# Patient Record
Sex: MALE | Race: BLACK OR AFRICAN AMERICAN | NOT HISPANIC OR LATINO | URBAN - METROPOLITAN AREA
[De-identification: names, ages, dates, MRNs, and addresses within clinical notes are randomized per-mention and may not be internally consistent; named-entity substitution may affect disease eponyms.]

---

## 2018-05-11 ENCOUNTER — NEW REFERRAL (OUTPATIENT)
Dept: URBAN - METROPOLITAN AREA CLINIC 96 | Facility: CLINIC | Age: 63
End: 2018-05-11

## 2018-05-11 DIAGNOSIS — S05.12XA: ICD-10-CM

## 2018-05-11 DIAGNOSIS — H33.012: ICD-10-CM

## 2018-05-11 DIAGNOSIS — E11.9: ICD-10-CM

## 2018-05-11 PROCEDURE — 2022F DILAT RTA XM EVC RTNOPTHY: CPT

## 2018-05-11 PROCEDURE — 92225 OPHTHALMOSCOPY (INITIAL): CPT

## 2018-05-11 PROCEDURE — 92134 CPTRZ OPH DX IMG PST SGM RTA: CPT

## 2018-05-11 PROCEDURE — 99204 OFFICE O/P NEW MOD 45 MIN: CPT

## 2018-05-11 PROCEDURE — 4040F PNEUMOC VAC/ADMIN/RCVD: CPT | Mod: 8P

## 2018-05-11 PROCEDURE — G8427 DOCREV CUR MEDS BY ELIG CLIN: HCPCS

## 2018-05-11 ASSESSMENT — VISUAL ACUITY
OD_PH: 20/25-3
OD_CC: 20/50-3
OS_CC: 20/50-1

## 2018-05-11 ASSESSMENT — TONOMETRY
OD_IOP_MMHG: 14
OS_IOP_MMHG: 8

## 2018-05-16 ENCOUNTER — SURGERY/PROCEDURE (OUTPATIENT)
Dept: URBAN - METROPOLITAN AREA MEDICAL CENTER 2 | Facility: MEDICAL CENTER | Age: 63
End: 2018-05-16

## 2018-05-16 DIAGNOSIS — H33.012: ICD-10-CM

## 2018-05-16 PROCEDURE — 67107 REPAIR DETACHED RETINA: CPT

## 2018-05-16 PROCEDURE — 65815 DRAINAGE OF EYE: CPT

## 2018-05-16 PROCEDURE — 67025 REPLACE EYE FLUID: CPT

## 2018-05-18 ENCOUNTER — POST-OP CHECK (OUTPATIENT)
Dept: URBAN - METROPOLITAN AREA CLINIC 109 | Facility: CLINIC | Age: 63
End: 2018-05-18

## 2018-05-18 DIAGNOSIS — H33.012: ICD-10-CM

## 2018-05-18 DIAGNOSIS — E11.9: ICD-10-CM

## 2018-05-18 DIAGNOSIS — H25.813: ICD-10-CM

## 2018-05-18 PROCEDURE — 92226 OPHTHALMOSCOPY (SUB): CPT

## 2018-05-18 PROCEDURE — 99024 POSTOP FOLLOW-UP VISIT: CPT

## 2018-05-18 ASSESSMENT — TONOMETRY
OS_IOP_MMHG: 18
OD_IOP_MMHG: 12

## 2018-05-18 ASSESSMENT — VISUAL ACUITY
OS_SC: CF 5FT
OD_SC: 20/40
OS_PH: 20/60-3
OD_PH: 20/20-3

## 2018-05-25 ENCOUNTER — POST-OP CHECK (OUTPATIENT)
Dept: URBAN - METROPOLITAN AREA CLINIC 109 | Facility: CLINIC | Age: 63
End: 2018-05-25

## 2018-05-25 DIAGNOSIS — H25.813: ICD-10-CM

## 2018-05-25 DIAGNOSIS — H33.012: ICD-10-CM

## 2018-05-25 DIAGNOSIS — E11.9: ICD-10-CM

## 2018-05-25 PROCEDURE — 92134 CPTRZ OPH DX IMG PST SGM RTA: CPT

## 2018-05-25 PROCEDURE — 92226 OPHTHALMOSCOPY (SUB): CPT

## 2018-05-25 PROCEDURE — 99024 POSTOP FOLLOW-UP VISIT: CPT

## 2018-05-25 ASSESSMENT — TONOMETRY
OD_IOP_MMHG: 16
OD_IOP_MMHG: 21
OS_IOP_MMHG: 18

## 2018-05-25 ASSESSMENT — VISUAL ACUITY
OD_PH: 20/20
OS_SC: 20/80-1
OD_SC: 20/25

## 2018-06-08 ENCOUNTER — POST-OP CHECK (OUTPATIENT)
Dept: URBAN - METROPOLITAN AREA CLINIC 109 | Facility: CLINIC | Age: 63
End: 2018-06-08

## 2018-06-08 DIAGNOSIS — E11.9: ICD-10-CM

## 2018-06-08 DIAGNOSIS — H33.012: ICD-10-CM

## 2018-06-08 PROCEDURE — 92134 CPTRZ OPH DX IMG PST SGM RTA: CPT

## 2018-06-08 PROCEDURE — 99024 POSTOP FOLLOW-UP VISIT: CPT

## 2018-06-08 PROCEDURE — 92226 OPHTHALMOSCOPY (SUB): CPT

## 2018-06-08 ASSESSMENT — VISUAL ACUITY
OD_CC: 20/40
OS_PH: 20/60
OS_CC: 20/160
OD_PH: 20/25

## 2018-06-08 ASSESSMENT — TONOMETRY
OS_IOP_MMHG: 21
OD_IOP_MMHG: 21
OS_IOP_MMHG: 18
OD_IOP_MMHG: 22

## 2018-12-21 ENCOUNTER — HOSPITAL ENCOUNTER (INPATIENT)
Dept: HOSPITAL 31 - C.ER | Age: 63
LOS: 7 days | Discharge: LEFT BEFORE DISCHARGE | DRG: 638 | End: 2018-12-28
Attending: INTERNAL MEDICINE | Admitting: INTERNAL MEDICINE
Payer: MEDICARE

## 2018-12-21 DIAGNOSIS — I42.9: ICD-10-CM

## 2018-12-21 DIAGNOSIS — E78.5: ICD-10-CM

## 2018-12-21 DIAGNOSIS — K52.9: ICD-10-CM

## 2018-12-21 DIAGNOSIS — F13.10: ICD-10-CM

## 2018-12-21 DIAGNOSIS — R00.1: ICD-10-CM

## 2018-12-21 DIAGNOSIS — Z91.19: ICD-10-CM

## 2018-12-21 DIAGNOSIS — F11.23: ICD-10-CM

## 2018-12-21 DIAGNOSIS — F17.210: ICD-10-CM

## 2018-12-21 DIAGNOSIS — Z91.14: ICD-10-CM

## 2018-12-21 DIAGNOSIS — I10: ICD-10-CM

## 2018-12-21 DIAGNOSIS — F41.9: ICD-10-CM

## 2018-12-21 DIAGNOSIS — E11.622: Primary | ICD-10-CM

## 2018-12-21 DIAGNOSIS — M17.11: ICD-10-CM

## 2018-12-21 DIAGNOSIS — L97.919: ICD-10-CM

## 2018-12-21 LAB
ALBUMIN SERPL-MCNC: 4.6 G/DL (ref 3.5–5)
ALBUMIN/GLOB SERPL: 1.2 {RATIO} (ref 1–2.1)
ALT SERPL-CCNC: 10 U/L (ref 21–72)
AST SERPL-CCNC: 20 U/L (ref 17–59)
BACTERIA #/AREA URNS HPF: (no result) /[HPF]
BASOPHILS # BLD AUTO: 0.1 K/UL (ref 0–0.2)
BASOPHILS NFR BLD: 0.8 % (ref 0–2)
BILIRUB UR-MCNC: (no result) MG/DL
BUN SERPL-MCNC: 12 MG/DL (ref 9–20)
CALCIUM SERPL-MCNC: 9.6 MG/DL (ref 8.6–10.4)
CK MB SERPL-MCNC: 1.3 NG/ML (ref 0–3.38)
EOSINOPHIL # BLD AUTO: 0 K/UL (ref 0–0.7)
EOSINOPHIL NFR BLD: 0 % (ref 0–4)
ERYTHROCYTE [DISTWIDTH] IN BLOOD BY AUTOMATED COUNT: 15.9 % (ref 11.5–14.5)
GFR NON-AFRICAN AMERICAN: > 60
GLUCOSE UR STRIP-MCNC: NORMAL MG/DL
HGB BLD-MCNC: 13.1 G/DL (ref 12–18)
LEUKOCYTE ESTERASE UR-ACNC: (no result) LEU/UL
LIPASE: 65 U/L (ref 23–300)
LYMPHOCYTES # BLD AUTO: 0.9 K/UL (ref 1–4.3)
LYMPHOCYTES NFR BLD AUTO: 13.9 % (ref 20–40)
MCH RBC QN AUTO: 30.5 PG (ref 27–31)
MCHC RBC AUTO-ENTMCNC: 34.5 G/DL (ref 33–37)
MCV RBC AUTO: 88.5 FL (ref 80–94)
MONOCYTES # BLD: 0.2 K/UL (ref 0–0.8)
MONOCYTES NFR BLD: 3.3 % (ref 0–10)
NEUTROPHILS # BLD: 5.2 K/UL (ref 1.8–7)
NEUTROPHILS NFR BLD AUTO: 82 % (ref 50–75)
NRBC BLD AUTO-RTO: 0 % (ref 0–2)
PH UR STRIP: 5 [PH] (ref 5–8)
PLATELET # BLD: 311 K/UL (ref 130–400)
PMV BLD AUTO: 8.4 FL (ref 7.2–11.7)
PROT UR STRIP-MCNC: (no result) MG/DL
RBC # BLD AUTO: 4.31 MIL/UL (ref 4.4–5.9)
RBC # UR STRIP: NEGATIVE /UL
SP GR UR STRIP: 1.03 (ref 1–1.03)
SQUAMOUS EPITHIAL: 3 /HPF (ref 0–5)
UROBILINOGEN UR-MCNC: 2 MG/DL (ref 0.2–1)
WBC # BLD AUTO: 6.3 K/UL (ref 4.8–10.8)

## 2018-12-21 NOTE — C.PDOC
History Of Present Illness





63 years old male BIBA from pain management center [facility] the [address] for 

abdominal pain associated with nausea, vomiting, diarrhea, and shortness of 

breath that began this morning. Patient reports he has an appt with Dr. KATJA Hyman 

to get his oxycontin, however he began to feel ill prior to the appt.  He states

last time he took his pain medication was 2 days ago.  He also admits to non-

compliance with his diabetes medications x 2 days.  Patient admits to prior 

episodes of opiate withdrawal, states this feels similar.  Patient denies chest 

pain, cough, fever, palpitations, headache, dizziness.


Time Seen by Provider: 12/21/18 12:09


Chief Complaint (Nursing): Abdominal Pain


History Per: Patient


History/Exam Limitations: no limitations


Onset/Duration Of Symptoms: Hrs


Current Symptoms Are (Timing): Still Present


Severity: Moderate


Location Of Pain/Discomfort: Other (diffuse abd pain)


Radiation Of Pain To:: None


Associated Symptoms: Nausea, Vomiting, Diarrhea


Exacerbating Factors: None


Alleviating Factors: None


Last Bowel Movement: Today


Recent travel outside of the [place]: No





Past Medical History


Reviewed: Historical Data, Nursing Documentation, Vital Signs


Vital Signs: 





                                Last Vital Signs











Temp  97.4 F L  12/21/18 12:13


 


Pulse  57 L  12/21/18 13:21


 


Resp  18   12/21/18 13:21


 


BP  121/64   12/21/18 13:21


 


Pulse Ox  97   12/21/18 13:21














- Medical History


PMH: Arthritis, Fractures (Right leg), HTN


Family History: States: No Known Family Hx





- Social History


Hx Alcohol Use: Yes


Hx Substance Use: Yes





- Immunization History


Hx Tetanus Toxoid Vaccination: No


Hx Influenza Vaccination: No


Hx Pneumococcal Vaccination: No





Review Of Systems


Constitutional: Negative for: Fever, Chills


Cardiovascular: Negative for: Chest Pain, Palpitations


Respiratory: Positive for: Shortness of Breath.  Negative for: Cough


Gastrointestinal: Positive for: Nausea, Vomiting, Abdominal Pain, Diarrhea


Skin: Negative for: Rash


Neurological: Negative for: Weakness, Numbness





Physical Exam





- Physical Exam


Appears: Non-toxic, Other (Uncomfortable appearing)


Skin: Normal Color, Warm, Dry, No Rash


Eye(s): bilateral: Normal Inspection


Oral Mucosa: Moist


Neck: Supple


Cardiovascular: Rhythm Regular (Bradycardic ), No Murmur


Respiratory: Normal Breath Sounds, No Rales, No Rhonchi, No Wheezing


Gastrointestinal/Abdominal: Bowel Sounds (Active ), Soft, Tenderness (diffuse 

abdominal TTP, (-) McBurney's, (-) Santana's), No Guarding, No Rebound


Extremity: Normal ROM, Other (right lower leg - chronic skin changes, approx 5-

6cm wound in vertical orientation with mild discharge)


Extremity: Bilateral: Atraumatic, Normal Color And Temperature, Normal ROM


Pulses: Left Dorsalis Pedis: Normal, Right Dorsalis Pedis: Normal


Neurological/Psych: Oriented x3


Gait: Steady





ED Course And Treatment





- Laboratory Results


Result Diagrams: 


                                 12/23/18 08:14





                                 12/26/18 06:42


ECG: Interpreted By Me, Viewed By Me (sinus bradycardia 41 bpm, normal axis, no 

acute ST/T wave changes)


ECG Interpretation: Abnormal


O2 Sat by Pulse Oximetry: 97 (RA)


Pulse Ox Interpretation: Normal





- CT Scan/US


  ** CT ABD/PELVIS


Other Rad Studies (CT/US): Read By Radiologist, Radiology Report Reviewed


CT/US Interpretation: Accession No. : [id number].  Patient Name / ID : 

AMY MAXWELL  / [id number].  Exam Date : 12/21/2018 14:55:59 ( Approved ).  

Study Comment :  Sex / Age : M  / 63Y.  Creator : Amy Pfeiffer.  Dictator : 

Inés Sandoval MD.   :  Approver : Inés Sandoval MD.  

Approver2 :  Report Date : 12/21/2018 15:08:09.  My Comment :  

******************************************************************

*****************.  PROCEDURE:  CT Abdomen and Pelvis without Oral or IV 

contrast.  HISTORY:  ABD PAIN, NAUSEA/VOMITING.  COMPARISON:  None available.  

TECHNIQUE:  Contiguous axial images of the abdomen and pelvis. No oral or IV 

contrast administered. Coronal and Sagittal reformats generated and reviewed.  

Radiation dose:  Total exam DLP = 670.28 mGy-cm.  This CT exam was performed 

using one or more of the following dose reduction techniques: Automated exposure

control, adjustment of the mA and/or kV according to patient size, and/or use of

iterative reconstruction technique.  FINDINGS:  There is limited evaluation of 

the solid organs without the administration of IV contrast.  LOWER THORAX:  No 

visible consolidation, pleural effusion, or pneumothorax.  Mild cardiomegaly.  

LIVER:  Unremarkable unenhanced appearance.  GALLBLADDER AND BILE DUCTS:  

Unremarkable unenhanced appearance.  PANCREAS:  Unremarkable unenhanced 

appearance.  SPLEEN:  Unremarkable unenhanced appearance.  ADRENALS:  

Unremarkable unenhanced appearance.  KIDNEYS AND URETERS:  No hydronephrosis or 

obstructing renal calculus.  BLADDER:  The urinary bladder appears unremarkable.

 REPRODUCTIVE:  Prostate gland measures approximately 3.5 x 4.3 cm.  APPENDIX:  

The appendix appears within normal limits of caliber. No secondary signs of 

acute appendicitis.  BOWEL:  The stomach is nondistended.  Lack of oral contrast

limits evaluation for bowel pathology.   The bowel loops appear within normal 

limits of caliber without evidence of intestinal obstruction.  Mild wall 

thickening/mucosal edema involving the right colon; correlate clinically for 

possibility of colitis.  PERITONEUM:  No significant free fluid. No definite 

free air.  LYMPH NODES:  No bulky lymphadenopathy identified.  VASCULATURE:  No 

aortic aneurysm.  BONES:  Multilevel degenerative changes of the spine.  OTHER 

FINDINGS:  Fat containing umbilical hernia.  IMPRESSION:  Mild wall 

thickening/mucosal edema involving the right colon; correlate clinically for 

possibility of colitis.  Additional findings as above.


Progress Note: Blood work, EKG, UA, UDS ordered and reviewed.  Patient given IV 

NS bolus, IV toradol.  Heart rate dropped into the 30s, pacer pads placed on 

patient and atropine 0.5mg IV given - patient responded well, heart rate 

afterwards in the 60s.  Patient reports right leg wound has been present for 

approx 1 year, and has recently been improving.  CT shows evidence of colitis - 

IV Cipro + IV flagyl given.





- Physician Consult Information


Physician Contacted: Louis Grant


Outcome Of Conversation: Discussed patient with medicine on call, agrees with 

admission for sinus bradycardia, colitis, right leg wound, opiate 

dependence/withdrawal.





Critical Care Time





- Critical Care Note


Total Time (in mins): 40


Documented critical care: time excludes all time spent performing seperately 

billable procedures.





Disposition





- Disposition


Disposition: HOSPITALIZED


Disposition Time: 16:09


Condition: STABLE





- Clinical Impression


Clinical Impression: 


 Sinus bradycardia, Colitis, Leg wound, right, Opiate dependence, Opiate 

withdrawal








- Scribe Statement


The provider has reviewed the documentation as recorded by the Scribe





Tati Chun





All medical record entries made by the Scribe were at my direction and 

personally dictated by me. I have reviewed the chart and agree that the record 

accurately reflects my personal performance of the history, physical exam, 

medical decision making, and the department course for this patient. I have also

personally directed, reviewed, and agree with the discharge instructions and 

disposition.





Decision To Admit





- Pt Status Changed To:


Hospital Disposition Of: Inpatient





- Admit Certification


Admit to Inpatient:: After my assessment, the patient will require 

hospitalization for at least two midnights.  This is because of the severity of 

symptoms shown, intensity of services needed, and/or the medical risk in this 

patient being treated as an outpatient.





- InPatient:


Physician Admission Certification:: see notes





- .


Bed Request Type: Telemetry


Admitting Physician: Louis Grant


Patient Diagnosis: 


 Leg wound, right, Opiate dependence, Opiate withdrawal, Colitis

## 2018-12-21 NOTE — CP.PCM.HP
History of Present Illness





- History of Present Illness


History of Present Illness: 





COMPREHENSIVE CONSULT    








HPI


Patient presented to [facility] emergency room complaining of abdominal 

pain nauseous feeling and vomiting.  As per the patient patient is on heroine 

oxycodone MS Contin and Xanax patient apparently ran out of his medication 2 or 

3 days ago and claims that he is in withdrawal.


Patient has a history of cardiomyopathy and low heart rate.


Patient recently had a peripheral vascular intervention on the right leg with 

bypass and since then the wound on the lower extremity has not healed.  Patient 

has failed to follow with his primary care or the surgeon who operated him in 

[place] 





PAST HIST.


PERSONAL HIST:   Smoking yes.     Alcohol.  Yes    Allergy N            Travel_-

     .


FAMILY HIST : 


ROS :


Constitutional: Negative for weight change  Eyes: Negative for redness, 

swelling, itching, discharge, vision changes, blurry vision, double vision, 

glaucoma, cataracts, 


  Ears: Negative for hearing loss, ringing, , tinnitus, vertigo


 Nose: Negative for rhinorrhea, stuffiness, sniffing, itching, postnasal drip, 

discoloration, nasal congestion and epistaxis. 


 Throat: Negative for throat clearing, sore throat, hoarseness, difficulty 

swallowing and difficulty speaking. 


  Respiratory: Negative for cough, , sputum production, chest tightness,  

wheezing, pleuritic chest pain ,daytime somnolence, chronic cough, hemoptysis, 

snoring at night,


 Cardiovascular: Negative for chest pain, palpitations, orthopnea, PND, Edema of

legs, leg cramps, angina, claudication, , irregular heartbeat,


 Neurology: Negative for recurrent headaches 


Gastrointestinal: Negative for difficulty swallowing, diarrhea, constipation, 

black stools, rectal bleeding, nausea, flatulence, reflux, poor appetite, 

changes in bowel habits, abdominal pain


  Genitourinary: Negative for frequent urination, hematuria, discharge, 

incontinence, urinary retention, frequent UTI, Psychiatric: Negative for 

depression, anxiety/panic, suicidal tendencies, 


Musculoskeletal: Negative for swollen joints, back pain, , neck pain, morning 

stiffness of joints, . 


 Skin: Negative for rash, ulcers, itching, dry skin and pigmented lesions.


P/E: 


  Constitutional: Appears stated age and in no apparent distress. 


 Head: Normocephalic. 


  Ears: External ear canals patent without inflammation. Tympanic membranes 

intact with normal light reflex and landmark. 


  Eyes: Pupils are central, bilaterally equal, symmetrical and reacts to light 

with normal movements and no icterus or pallor. 


 Nose: External nares are patent. Mucosa is pink  Mouth-Throat: Good general 

appearance and condition. No post-pharyngeal/oropharyngeal erythema and 

tonsillar hypertrophy. Good dental hygiene. 


  Neck-Lymphatic: Neck is supple with normal ROM, no thyromegaly, lymph nodes or

masses. JVD is normal with no carotid bruit. 


  Lungs: Clear to percussion and auscultation with bilateral normal air entry. 


  Cardiovascular: S1 and S2 are normal with no murmurs, gallops and rub. 


  GI Exam: No hepatomegaly. Abdomen is soft and non-tender. No Organomegaly , 

masses or hernias are evident and bowel sounds are normal and active. 


  Neurology: Higher function and all cranial nerves intact, with no gross motor 

or sensory deficit. Superficial and deep reflexes are normal with downwards 

planters. No cerebellar deficit with normal gait. 


  Musculoskeletal: No tender spots with normal curvature of the spine with no 

swelling or restricted ROM of the small and large joints. 


  Extremities: Homans sign absent. Intact pulses with no pitting edema, calf 

tenderness or skin color changes.  Right extremity is swollen.  On the lower 

outer side there is a nonhealing ulcer with purulent discharge


  Skin: No rash, eruptions or abnormal skin pigmentation





LAB/RADIOLOGY:


ASSESMENT :


Narcotic withdrawal.


Sinus bradycardia


Nonhealing ulcer of the right lower extremities


Type 2 diabetes





PLAN: 


See orders








Present on Admission





- Present on Admission


Any Indicators Present on Admission: No





Past Patient History





- Past Social History


Smoking Status: Heavy Smoker > 10 Cigarettes Daily





- CARDIAC


Hx Hypertension: Yes





- ENDOCRINE/METABOLIC


Hx Diabetes Mellitus Type 2: Yes





- MUSCULOSKELETAL/RHEUMATOLOGICAL


Hx Arthritis: Yes


Hx Fractures: Yes (Right leg)





- PSYCHIATRIC


Hx Substance Use: Yes





- SURGICAL HISTORY


Hx Orthopedic Surgery: Yes (Right leg)





- ANESTHESIA


Hx Anesthesia: Yes


Hx Anesthesia Reactions: No





Meds


Allergies/Adverse Reactions: 


                                    Allergies











Allergy/AdvReac Type Severity Reaction Status Date / Time


 


No Known Allergies Allergy   Verified 12/21/18 12:14














Results





- Vital Signs


Recent Vital Signs: 





                                Last Vital Signs











Temp  98.9 F   12/21/18 18:27


 


Pulse  41 L  12/21/18 18:27


 


Resp  12   12/21/18 18:27


 


BP  139/61   12/21/18 18:27


 


Pulse Ox  99   12/21/18 18:27














- Labs


Result Diagrams: 


                                 12/22/18 07:58





                                 12/22/18 08:01


Labs: 





                         Laboratory Results - last 24 hr











  12/21/18 12/21/18 12/21/18





  12:13 13:03 13:03


 


WBC   6.3 


 


RBC   4.31 L 


 


Hgb   13.1 


 


Hct   38.1 


 


MCV   88.5 


 


MCH   30.5 


 


MCHC   34.5 


 


RDW   15.9 H 


 


Plt Count   311 


 


MPV   8.4 


 


Neut % (Auto)   82.0 H 


 


Lymph % (Auto)   13.9 L 


 


Mono % (Auto)   3.3 


 


Eos % (Auto)   0.0 


 


Baso % (Auto)   0.8 


 


Neut # (Auto)   5.2 


 


Lymph # (Auto)   0.9 L 


 


Mono # (Auto)   0.2 


 


Eos # (Auto)   0.0 


 


Baso # (Auto)   0.1 


 


Sodium    137


 


Potassium    3.5 L


 


Chloride    105


 


Carbon Dioxide    18 L


 


Anion Gap    17


 


BUN    12


 


Creatinine    0.9


 


Est GFR ( Amer)    > 60


 


Est GFR (Non-Af Amer)    > 60


 


POC Glucose (mg/dL)  149 H  


 


Random Glucose    160 H D


 


Calcium    9.6


 


Total Bilirubin    0.9


 


AST    20


 


ALT    10 L D


 


Alkaline Phosphatase    109


 


Total Creatine Kinase    139


 


CK-MB (Mass)    1.30


 


Troponin I    < 0.0120


 


Total Protein    8.5 H


 


Albumin    4.6


 


Globulin    3.9


 


Albumin/Globulin Ratio    1.2


 


Lipase    65


 


Urine Color   


 


Urine Clarity   


 


Urine pH   


 


Ur Specific Gravity   


 


Urine Protein   


 


Urine Glucose (UA)   


 


Urine Ketones   


 


Urine Blood   


 


Urine Nitrate   


 


Urine Bilirubin   


 


Urine Urobilinogen   


 


Ur Leukocyte Esterase   


 


Urine WBC (Auto)   


 


Urine RBC (Auto)   


 


Ur Squamous Epith Cells   


 


Urine Bacteria   


 


Urine Opiates Screen   


 


Urine Methadone Screen   


 


Ur Barbiturates Screen   


 


Ur Phencyclidine Scrn   


 


Ur Amphetamines Screen   


 


U Benzodiazepines Scrn   


 


U Oth Cocaine Metabols   


 


U Cannabinoids Screen   














  12/21/18 12/21/18





  14:39 14:39


 


WBC  


 


RBC  


 


Hgb  


 


Hct  


 


MCV  


 


MCH  


 


MCHC  


 


RDW  


 


Plt Count  


 


MPV  


 


Neut % (Auto)  


 


Lymph % (Auto)  


 


Mono % (Auto)  


 


Eos % (Auto)  


 


Baso % (Auto)  


 


Neut # (Auto)  


 


Lymph # (Auto)  


 


Mono # (Auto)  


 


Eos # (Auto)  


 


Baso # (Auto)  


 


Sodium  


 


Potassium  


 


Chloride  


 


Carbon Dioxide  


 


Anion Gap  


 


BUN  


 


Creatinine  


 


Est GFR ( Amer)  


 


Est GFR (Non-Af Amer)  


 


POC Glucose (mg/dL)  


 


Random Glucose  


 


Calcium  


 


Total Bilirubin  


 


AST  


 


ALT  


 


Alkaline Phosphatase  


 


Total Creatine Kinase  


 


CK-MB (Mass)  


 


Troponin I  


 


Total Protein  


 


Albumin  


 


Globulin  


 


Albumin/Globulin Ratio  


 


Lipase  


 


Urine Color  Clau 


 


Urine Clarity  Clear 


 


Urine pH  5.0 


 


Ur Specific Gravity  1.033 H 


 


Urine Protein  2+ H 


 


Urine Glucose (UA)  Normal 


 


Urine Ketones  1+ H 


 


Urine Blood  Negative 


 


Urine Nitrate  Negative 


 


Urine Bilirubin  1+ H 


 


Urine Urobilinogen  2.0 


 


Ur Leukocyte Esterase  Neg 


 


Urine WBC (Auto)  3 


 


Urine RBC (Auto)  3 


 


Ur Squamous Epith Cells  3 


 


Urine Bacteria  Rare 


 


Urine Opiates Screen   Positive H


 


Urine Methadone Screen   Negative


 


Ur Barbiturates Screen   Negative


 


Ur Phencyclidine Scrn   Negative


 


Ur Amphetamines Screen   Negative


 


U Benzodiazepines Scrn   Positive


 


U Oth Cocaine Metabols   Negative


 


U Cannabinoids Screen   Negative

## 2018-12-22 LAB
ALBUMIN SERPL-MCNC: 4 G/DL (ref 3.5–5)
ALBUMIN/GLOB SERPL: 1.2 {RATIO} (ref 1–2.1)
ALT SERPL-CCNC: 15 U/L (ref 21–72)
AST SERPL-CCNC: 20 U/L (ref 17–59)
BASOPHILS # BLD AUTO: 0 K/UL (ref 0–0.2)
BASOPHILS NFR BLD: 0.3 % (ref 0–2)
BUN SERPL-MCNC: 17 MG/DL (ref 9–20)
CALCIUM SERPL-MCNC: 9.1 MG/DL (ref 8.6–10.4)
EOSINOPHIL # BLD AUTO: 0 K/UL (ref 0–0.7)
EOSINOPHIL NFR BLD: 0 % (ref 0–4)
ERYTHROCYTE [DISTWIDTH] IN BLOOD BY AUTOMATED COUNT: 16.1 % (ref 11.5–14.5)
ERYTHROCYTE [SEDIMENTATION RATE] IN BLOOD: 20 MM/HR (ref 0–15)
GFR NON-AFRICAN AMERICAN: > 60
HGB BLD-MCNC: 11.9 G/DL (ref 12–18)
LYMPHOCYTES # BLD AUTO: 1.7 K/UL (ref 1–4.3)
LYMPHOCYTES NFR BLD AUTO: 18.8 % (ref 20–40)
MCH RBC QN AUTO: 30.2 PG (ref 27–31)
MCHC RBC AUTO-ENTMCNC: 34.3 G/DL (ref 33–37)
MCV RBC AUTO: 88 FL (ref 80–94)
MONOCYTES # BLD: 0.9 K/UL (ref 0–0.8)
MONOCYTES NFR BLD: 9.6 % (ref 0–10)
NEUTROPHILS # BLD: 6.5 K/UL (ref 1.8–7)
NEUTROPHILS NFR BLD AUTO: 71.3 % (ref 50–75)
NRBC BLD AUTO-RTO: 0 % (ref 0–2)
PLATELET # BLD: 281 K/UL (ref 130–400)
PMV BLD AUTO: 8.7 FL (ref 7.2–11.7)
RBC # BLD AUTO: 3.94 MIL/UL (ref 4.4–5.9)
T4 FREE SERPL-MCNC: 0.95 NG/DL (ref 0.78–2.19)
WBC # BLD AUTO: 9.1 K/UL (ref 4.8–10.8)

## 2018-12-22 RX ADMIN — CEFTRIAXONE SCH MLS/HR: 1 INJECTION, SOLUTION INTRAVENOUS at 11:12

## 2018-12-22 RX ADMIN — HUMAN INSULIN SCH: 100 INJECTION, SOLUTION SUBCUTANEOUS at 07:48

## 2018-12-22 RX ADMIN — CEFTRIAXONE SCH MLS/HR: 1 INJECTION, SOLUTION INTRAVENOUS at 00:12

## 2018-12-22 RX ADMIN — DEXTROSE AND SODIUM CHLORIDE SCH MLS/HR: 5; 450 INJECTION, SOLUTION INTRAVENOUS at 13:28

## 2018-12-22 RX ADMIN — HUMAN INSULIN SCH: 100 INJECTION, SOLUTION SUBCUTANEOUS at 21:39

## 2018-12-22 RX ADMIN — CEFEPIME SCH MLS/HR: 1 INJECTION, SOLUTION INTRAVENOUS at 18:09

## 2018-12-22 RX ADMIN — HUMAN INSULIN SCH: 100 INJECTION, SOLUTION SUBCUTANEOUS at 18:08

## 2018-12-22 RX ADMIN — HUMAN INSULIN SCH: 100 INJECTION, SOLUTION SUBCUTANEOUS at 12:36

## 2018-12-22 NOTE — CP.PCM.PN
Subjective





- Date & Time of Evaluation


Date of Evaluation: 12/22/18


Time of Evaluation: 14:32





- Subjective


Subjective: 





CHIEF COMPLAINTS TODAY :


Complains of generalized body pain nauseous feeling and leg pains





ROS.


HEENT :  N.


Resp :       No cough, wheezing ,pleuritic CP ,or hemoptysis 


Cardio :     No anginal  CP, PND, orthopnea, palpitation 


GI :           No abd.pain, n/v ,diarrhea or GI bleeding .


CNS : No headache, vertigo, focal deficit.


Musculoskel :  No joint swelling ,


Derm :        No rash 


Psych :     Normal affect.


Ext :  No  swelling ,calf pain 





PE.


Pt. is alert awake in no distress.


V.S  As noted in the chart 


Head ,ear nose,throat and eyes : Normal.


Neck : Supple with normal carotids.


Lungs: Clear air entry.


Heart : S1 & S2 normal with S4. No murmur.


Abd : Soft non tender with normal bowel sounds.


Neuro : Moves all ext. with no localized deficit.


Ext : No edema with intact pulses.Non tender calves 


Derm : No rashes or decubitus ulcer.





LABS/RADIOLOGY:





ASSESSMENT/PLAN : 


Heart rate is still between 38-40/min with no hemodynamic compromise.


The wound on the lower right leg shows gram-negative rods currently on IV 

antibiotics and local wound care.


Thyroid function test are normal.


Hold morphine due to severe bradycardia











Objective





- Vital Signs/Intake and Output


Vital Signs (last 24 hours): 


                                        











Temp Pulse Resp BP Pulse Ox


 


 98.6 F   40 L  20   119/52 L  97 


 


 12/22/18 07:00  12/22/18 07:00  12/22/18 07:00  12/22/18 07:00  12/22/18 07:00











- Medications


Medications: 


                               Current Medications





Heparin Sodium (Porcine) (Heparin)  5,000 units SC Q12 JARVIS


   Last Admin: 12/22/18 10:48 Dose:  5,000 units


Dextrose/Sodium Chloride (Dextrose 5%/0.45% Ns 1000 Ml)  1,000 mls @ 70 mls/hr 

IV .[id number] [facility]


   Last Admin: 12/22/18 13:28 Dose:  70 mls/hr


Cefepime HCl (Maxipime Iv 2 Gm Premix)  2 gm in 100 mls @ 200 mls/hr IVPB Q12H 

[facility]; Protocol


   Stop: 12/27/18 14:31


Insulin Human Regular (Novolin R)  0 unit SC [facility]; Protocol


   Last Admin: 12/22/18 12:36 Dose:  Not Given


Ondansetron HCl (Zofran Inj)  4 mg IVP Q8 PRN


   PRN Reason: Nausea/Vomiting


   Last Admin: 12/22/18 06:12 Dose:  4 mg


Tramadol HCl (Ultram)  50 mg PO Q8 PRN


   PRN Reason: Pain, severe (8-10)


   Last Admin: 12/22/18 06:13 Dose:  50 mg











- Labs


Labs: 


                                        





                                 12/22/18 07:58 





                                 12/22/18 08:01

## 2018-12-22 NOTE — CP.PCM.PN
Subjective





- Date & Time of Evaluation


Date of Evaluation: 12/22/18


Time of Evaluation: 14:41





- Subjective


Subjective: 





CHIEF COMPLAINTS TODAY :


AFEBRILE 


VS as noted.


c/o  generalized body pain


+ve pain rt.LE





ROS.


HEENT :  N.


Resp :       No cough, wheezing ,pleuritic CP ,or hemoptysis 


Cardio :     No anginal  CP, PND, orthopnea, palpitation 


GI :           No abd.pain, n/v ,diarrhea or GI bleeding .


CNS : No headache, vertigo, focal deficit.


Musculoskel :  No joint swelling ,


Derm :        No rash 


Psych :     Normal affect.


Ext :  No  swelling ,calf pain +VE LINEAR ULCER ANTERIOR SHIN WITH MACERATED 

SKIN ON MARGINS.


+VE SEROSANGUINOUS DRAINAGE





PE.


Pt. is alert awake in no distress.


V.S  As noted in the chart 


Head ,ear nose,throat and eyes : Normal.


Neck : Supple with normal carotids.


Lungs: Clear air entry.


Heart : S1 & S2 normal, HR 38 -46/MIN


Abd : Soft non tender with normal bowel sounds.


Neuro : Moves all ext. with no localized deficit.


Ext : No edema with intact pulses.Non tender calves +VE LINEAR ULCER ANTERIOR 

SHIN WITH MACERATED SKIN ON MARGINS.


+VE SEROSANGUINOUS DRAINAGE





Derm : No rashes or decubitus ulcer.





LABS/RADIOLOGY:


WOUND CULTURE +VE GNR.


BLOOD CULTURES 2:2 SETS -VE TO DATE





ASSESSMENT/PLAN : 


Heart rate is still between 38-40/min with no hemodynamic compromise.


The wound on the lower right leg shows gram-negative rods .


DC IV ROCEPHIN.


START IV CEFEPIME 2GM IVPB Q12 HRLY 12/22/18  





 local wound care.











Objective





- Vital Signs/Intake and Output


Vital Signs (last 24 hours): 


                                        











Temp Pulse Resp BP Pulse Ox


 


 98.6 F   40 L  20   119/52 L  97 


 


 12/22/18 07:00  12/22/18 07:00  12/22/18 07:00  12/22/18 07:00  12/22/18 07:00








Intake and Output: 


                                        











 12/22/18 12/22/18





 06:59 18:59


 


Intake Total 120 


 


Output Total 100 


 


Balance 20 














- Medications


Medications: 


                               Current Medications





Heparin Sodium (Porcine) (Heparin)  5,000 units SC Q12 JARVIS


   Last Admin: 12/22/18 10:48 Dose:  5,000 units


Dextrose/Sodium Chloride (Dextrose 5%/0.45% Ns 1000 Ml)  1,000 mls @ 70 mls/hr 

IV .[id number] [facility]


   Last Admin: 12/22/18 13:28 Dose:  70 mls/hr


Cefepime HCl (Maxipime Iv 2 Gm Premix)  2 gm in 100 mls @ 200 mls/hr IVPB Q12H 

[facility]; Protocol


   Stop: 12/27/18 14:31


Insulin Human Regular (Novolin R)  0 unit SC ACHS [facility]; Protocol


   Last Admin: 12/22/18 12:36 Dose:  Not Given


Ondansetron HCl (Zofran Inj)  4 mg IVP Q8 PRN


   PRN Reason: Nausea/Vomiting


   Last Admin: 12/22/18 06:12 Dose:  4 mg


Tramadol HCl (Ultram)  50 mg PO Q8 PRN


   PRN Reason: Pain, severe (8-10)


   Last Admin: 12/22/18 06:13 Dose:  50 mg











- Labs


Labs: 


                                        





                                 12/22/18 07:58 





                                 12/22/18 08:01 











Assessment and Plan


(1) Colitis


Status: Acute   





(2) Diabetes mellitus


Status: Acute   





(3) Sinus bradycardia


Status: Acute   





(4) Ulcer of right lower leg


Status: Acute   





(5) Lower extremity pain, right


Status: Acute

## 2018-12-23 LAB
ALBUMIN SERPL-MCNC: 3.6 G/DL (ref 3.5–5)
ALBUMIN/GLOB SERPL: 1.1 {RATIO} (ref 1–2.1)
ALT SERPL-CCNC: 15 U/L (ref 21–72)
AST SERPL-CCNC: 16 U/L (ref 17–59)
BASOPHILS # BLD AUTO: 0 K/UL (ref 0–0.2)
BASOPHILS NFR BLD: 0.4 % (ref 0–2)
BUN SERPL-MCNC: 11 MG/DL (ref 9–20)
CALCIUM SERPL-MCNC: 8.7 MG/DL (ref 8.6–10.4)
EOSINOPHIL # BLD AUTO: 0 K/UL (ref 0–0.7)
EOSINOPHIL NFR BLD: 0 % (ref 0–4)
ERYTHROCYTE [DISTWIDTH] IN BLOOD BY AUTOMATED COUNT: 15.4 % (ref 11.5–14.5)
GFR NON-AFRICAN AMERICAN: > 60
HGB BLD-MCNC: 11.6 G/DL (ref 12–18)
LYMPHOCYTES # BLD AUTO: 2 K/UL (ref 1–4.3)
LYMPHOCYTES NFR BLD AUTO: 27.3 % (ref 20–40)
MCH RBC QN AUTO: 30.5 PG (ref 27–31)
MCHC RBC AUTO-ENTMCNC: 34.8 G/DL (ref 33–37)
MCV RBC AUTO: 87.5 FL (ref 80–94)
MONOCYTES # BLD: 0.8 K/UL (ref 0–0.8)
MONOCYTES NFR BLD: 10.4 % (ref 0–10)
NEUTROPHILS # BLD: 4.4 K/UL (ref 1.8–7)
NEUTROPHILS NFR BLD AUTO: 61.9 % (ref 50–75)
NRBC BLD AUTO-RTO: 0.1 % (ref 0–2)
PLATELET # BLD: 257 K/UL (ref 130–400)
PMV BLD AUTO: 8.7 FL (ref 7.2–11.7)
RBC # BLD AUTO: 3.81 MIL/UL (ref 4.4–5.9)
WBC # BLD AUTO: 7.2 K/UL (ref 4.8–10.8)

## 2018-12-23 RX ADMIN — HUMAN INSULIN SCH: 100 INJECTION, SOLUTION SUBCUTANEOUS at 12:04

## 2018-12-23 RX ADMIN — DEXTROSE AND SODIUM CHLORIDE SCH MLS/HR: 5; 450 INJECTION, SOLUTION INTRAVENOUS at 06:24

## 2018-12-23 RX ADMIN — VANCOMYCIN HYDROCHLORIDE SCH MLS/HR: 1 INJECTION, POWDER, LYOPHILIZED, FOR SOLUTION INTRAVENOUS at 18:13

## 2018-12-23 RX ADMIN — DEXTROSE AND SODIUM CHLORIDE SCH: 5; 450 INJECTION, SOLUTION INTRAVENOUS at 18:40

## 2018-12-23 RX ADMIN — DEXTROSE AND SODIUM CHLORIDE SCH: 5; 450 INJECTION, SOLUTION INTRAVENOUS at 04:20

## 2018-12-23 RX ADMIN — CEFEPIME SCH MLS/HR: 1 INJECTION, SOLUTION INTRAVENOUS at 18:14

## 2018-12-23 RX ADMIN — HUMAN INSULIN SCH: 100 INJECTION, SOLUTION SUBCUTANEOUS at 07:59

## 2018-12-23 RX ADMIN — HUMAN INSULIN SCH: 100 INJECTION, SOLUTION SUBCUTANEOUS at 18:14

## 2018-12-23 RX ADMIN — CEFEPIME SCH MLS/HR: 1 INJECTION, SOLUTION INTRAVENOUS at 06:25

## 2018-12-23 RX ADMIN — HUMAN INSULIN SCH: 100 INJECTION, SOLUTION SUBCUTANEOUS at 22:20

## 2018-12-23 NOTE — CP.PCM.PN
Subjective





- Date & Time of Evaluation


Date of Evaluation: 12/23/18


Time of Evaluation: 16:25





- Subjective


Subjective: 





CHIEF COMPLAINTS TODAY :


Complains of generalized body pain nauseous feeling and leg pains





ROS.


HEENT :  N.


Resp :       No cough, wheezing ,pleuritic CP ,or hemoptysis 


Cardio :     No anginal  CP, PND, orthopnea, palpitation 


GI :           No abd.pain, n/v ,diarrhea or GI bleeding .


CNS : No headache, vertigo, focal deficit.


Musculoskel :  No joint swelling ,


Derm :        No rash 


Psych :     Normal affect.


Ext :  No  swelling ,calf pain 





PE.


Pt. is alert awake in no distress.


V.S  As noted in the chart 


Head ,ear nose,throat and eyes : Normal.


Neck : Supple with normal carotids.


Lungs: Clear air entry.


Heart : S1 & S2 normal with S4. No murmur.


Abd : Soft non tender with normal bowel sounds.


Neuro : Moves all ext. with no localized deficit.


Ext : No edema with intact pulses.Non tender calves 


Derm : No rashes or decubitus ulcer.





LABS/RADIOLOGY: WOUND , MRSA ,PSEDOMONAS 





ASSESSMENT/PLAN : 


Heart rate is still between 38-40/min with no hemodynamic compromise.


IV AB 





Objective





- Vital Signs/Intake and Output


Vital Signs (last 24 hours): 


                                        











Temp Pulse Resp BP Pulse Ox


 


 98.9 F   47 L  20   134/64   98 


 


 12/23/18 07:00  12/23/18 12:20  12/23/18 07:00  12/23/18 07:00  12/23/18 04:15








Intake and Output: 


                                        











 12/23/18 12/23/18





 11:59 23:59


 


Intake Total 660 


 


Output Total 100 


 


Balance 560 














- Medications


Medications: 


                               Current Medications





Heparin Sodium (Porcine) (Heparin)  5,000 units SC Q12 [facility]


   Last Admin: 12/23/18 11:12 Dose:  5,000 units


Dextrose/Sodium Chloride (Dextrose 5%/0.45% Ns 1000 Ml)  1,000 mls @ 70 mls/hr 

IV .[id number] [facility]


   Last Admin: 12/23/18 06:24 Dose:  70 mls/hr


Cefepime HCl (Maxipime Iv 2 Gm Premix)  2 gm in 100 mls @ 200 mls/hr IVPB Q12H 

[facility]; Protocol


   Stop: 12/27/18 19:01


   Last Admin: 12/23/18 06:25 Dose:  200 mls/hr


Vancomycin/Sodium Chloride (Vancomycin 1 Gm/Ns 200 Ml)  1 gm in 200 mls @ 

133.333 mls/hr IVPB Q24H [facility]; Protocol


   Stop: 12/28/18 16:01


Insulin Human Regular (Novolin R)  0 unit SC ACHS [facility]; Protocol


   Last Admin: 12/23/18 12:04 Dose:  Not Given


Ondansetron HCl (Zofran Inj)  4 mg IVP Q8 PRN


   PRN Reason: Nausea/Vomiting


   Last Admin: 12/23/18 14:02 Dose:  4 mg


Tramadol HCl (Ultram)  50 mg PO Q8 PRN


   PRN Reason: Pain, severe (8-10)


   Last Admin: 12/23/18 08:44 Dose:  50 mg











- Labs


Labs: 


                                        





                                 12/23/18 08:14 





                                 12/23/18 08:14

## 2018-12-23 NOTE — PCM.PSYCH
Initial Psychiatric Evaluation





- Initial Psychiatric Evaluation


Type of Admission: Voluntary


Legal Status: Capacity


Chief Complaint (in patient's own words): 





"I'm withdrawing"


History of Present Illness and Precipitating Events: 





The patient is seen chart reviewed and case discussed.


Consultation was requested because of his opioid withdrawal.





This is a 63-year-old -American male, single with 3 children, all adults.

 The patient lives alone and he is a retired .





The patient admits to using heroin for the last 10 years and claims it is about 

4-5 bags but he also uses to content 2-3 times a day.


He was in detox 3 times but no rehab.


He denies drug use but smokes half pack per day cigarettes and takes Xanax 1-2 

mg every day for the last 5 years.


He admits to feeling very anxious and also describes opioid withdrawal symptoms.


He understood that his pulse needs to be normal to be able to use methadone 

safely.


Patient denies suicidality, homicidality, and no rubio or psychosis elicited.





Past psychiatric: Denies





Medical history: Disc hernia, high blood pressure, diabetes, heart disease





Family psychiatric: Unknown


Current Medications: 





Active Medications











Generic Name Dose Route Start Last Admin





  Trade Name Freq  PRN Reason Stop Dose Admin


 


Heparin Sodium (Porcine)  5,000 units  12/22/18 10:00  12/22/18 21:39





  Heparin  SC   5,000 units





  Q12 [facility]   Administration





     





     





     





     


 


Dextrose/Sodium Chloride  1,000 mls @ 70 mls/hr  12/22/18 14:00  12/23/18 06:24





  Dextrose 5%/0.45% Ns 1000 Ml  IV   70 mls/hr





  .[id number] [facility]   Administration





     





     





     





     


 


Cefepime HCl  2 gm in 100 mls @ 200 mls/hr  12/22/18 19:00  12/23/18 06:25





  Maxipime Iv 2 Gm Premix  IVPB  12/27/18 19:01  200 mls/hr





  Q12H [facility]   Administration





     





     





  Protocol   





     


 


Insulin Human Regular  0 unit  12/22/18 07:30  12/23/18 07:59





  Novolin R  SC   Not Given





  [facility]   





     





     





  Protocol   





     


 


Ondansetron HCl  4 mg  12/21/18 23:13  12/23/18 06:59





  Zofran Inj  IVP   4 mg





  Q8 PRN   Administration





  Nausea/Vomiting   





     





     





     


 


Tramadol HCl  50 mg  12/21/18 22:30  12/23/18 08:44





  Ultram  PO   50 mg





  Q8 PRN   Administration





  Pain, severe (8-10)   





     





     





     














Past Psychiatric History





- Past Psychiatric History


Previous Treatment History: Intensive Outpatient (not intensive)


Pertinent Medical Hx (Current Medical&Sleep Prob, Allergies): 





                                    Allergies











Allergy/AdvReac Type Severity Reaction Status Date / Time


 


No Known Allergies Allergy   Verified 12/21/18 12:14








                                        





oxyCODONE  04/29/16 


MetFORMIN  12/21/18 











Review of Systems





- Psychiatric


Psychiatric: Abnormal Sleep Pattern, Anxiety, Difficulty Concentrating.  absent:

Depression, Homicidal Ideation, Paranoia, Suicidal Ideation





Mental Status Examination





- Personal Presentation


Personal Presentation: Looks stated age





- Affect


Affect: Constricted





- Motor Activity


Motor Activity: Calm





- Reliability in Providing Information


Reliability in Providing Information: Good





- Speech


Speech: Organized





- Mood


Mood: Anxious





- Formal Thought Process


Formal Thought Process: No Impairment





- Cognitive Functions


Orientation: Person, Place, Situation, Time


Sensorium: Alert


Attention/Concentration: Easily distracted


Estimate of Intelligence: Average


Judgement: Intact, as evidence by: Insight regarding need for hospitalization


Memory: Recent intact, as evidence by: Ability to recall events of the day, 

Remote intact, as evidenced by: Abilit to recall sig. life events





- Risk


Risk: Withdrawal, Diminished functioning





- Strength & Assets Inventory


Strength & Assets Inventory: Cooperative





- Limitations


Limitations: Other





DSM 5 DX





- DSM 5


DSM 5 Diagnosis: 





Opioid withdrawal


Opioid use d/o - severe


Sedative, hypnotic or anxiolytic use disorder, mild


Anxiety d/o - unspecified








- Recommended/Plan of Treatment


Treatment Recommendations and Plan of Treatment: 





Taper with methadone WHEN pulse and BP are reasonably OK, ie P>60, BP>90/60


Monitor for benzo withdrawal and use Ativan as needed if BP and pulse are okay


As needed medications


All risks, benefits and alternatives of the meds discussed,


 and the pt agreed and understood. 


Supportive therapy and psychoeducation


MI for abstinence


Encourage MAT


Refer to rehab or IOP, and self-help groups


Teach healthy lifestyle methods, i.e. diet, exercise, meditation


Smoking cessation with MI


Nicotine patch if needed





34 min

## 2018-12-24 LAB
ALBUMIN SERPL-MCNC: 3.5 G/DL (ref 3.5–5)
ALBUMIN/GLOB SERPL: 1.1 {RATIO} (ref 1–2.1)
ALT SERPL-CCNC: 19 U/L (ref 21–72)
AST SERPL-CCNC: 30 U/L (ref 17–59)
BUN SERPL-MCNC: 8 MG/DL (ref 9–20)
CALCIUM SERPL-MCNC: 8.7 MG/DL (ref 8.6–10.4)
GFR NON-AFRICAN AMERICAN: > 60

## 2018-12-24 RX ADMIN — DEXTROSE AND SODIUM CHLORIDE SCH MLS/HR: 5; 450 INJECTION, SOLUTION INTRAVENOUS at 21:41

## 2018-12-24 RX ADMIN — HUMAN INSULIN SCH: 100 INJECTION, SOLUTION SUBCUTANEOUS at 16:52

## 2018-12-24 RX ADMIN — HUMAN INSULIN SCH: 100 INJECTION, SOLUTION SUBCUTANEOUS at 12:27

## 2018-12-24 RX ADMIN — HYDRALAZINE HYDROCHLORIDE SCH MG: 25 TABLET, FILM COATED ORAL at 18:03

## 2018-12-24 RX ADMIN — OXYCODONE HYDROCHLORIDE PRN MG: 30 TABLET ORAL at 18:04

## 2018-12-24 RX ADMIN — DEXTROSE AND SODIUM CHLORIDE SCH: 5; 450 INJECTION, SOLUTION INTRAVENOUS at 09:06

## 2018-12-24 RX ADMIN — CEFEPIME SCH MLS/HR: 1 INJECTION, SOLUTION INTRAVENOUS at 06:00

## 2018-12-24 RX ADMIN — HUMAN INSULIN SCH: 100 INJECTION, SOLUTION SUBCUTANEOUS at 21:52

## 2018-12-24 RX ADMIN — HUMAN INSULIN SCH: 100 INJECTION, SOLUTION SUBCUTANEOUS at 09:08

## 2018-12-24 RX ADMIN — VANCOMYCIN HYDROCHLORIDE SCH MLS/HR: 1 INJECTION, POWDER, LYOPHILIZED, FOR SOLUTION INTRAVENOUS at 18:03

## 2018-12-24 RX ADMIN — CEFEPIME SCH MLS/HR: 1 INJECTION, SOLUTION INTRAVENOUS at 19:25

## 2018-12-24 RX ADMIN — POTASSIUM CHLORIDE SCH MEQ: 20 TABLET, EXTENDED RELEASE ORAL at 12:30

## 2018-12-24 RX ADMIN — DEXTROSE AND SODIUM CHLORIDE SCH: 5; 450 INJECTION, SOLUTION INTRAVENOUS at 22:13

## 2018-12-24 RX ADMIN — DEXTROSE AND SODIUM CHLORIDE SCH MLS/HR: 5; 450 INJECTION, SOLUTION INTRAVENOUS at 01:55

## 2018-12-24 NOTE — CARD
--------------- APPROVED REPORT --------------





Date of service: 12/21/2018



EKG Measurement

Heart [id number]

[id number]

[id number]

[id number]



<Conclusion>

Sinus bradycardiaAV dissociation and juctional escape rhythm

## 2018-12-24 NOTE — CP.PCM.PN
Subjective





- Date & Time of Evaluation


Date of Evaluation: 12/24/18


Time of Evaluation: 19:29





- Subjective


Subjective: 





CHIEF COMPLAINTS TODAY :


AFEBRILE 


VS as noted.


c/o  generalized body pain


+ve pain rt.LE





ROS.


HEENT :  N.


Resp :       No cough, wheezing ,pleuritic CP ,or hemoptysis 


Cardio :     No anginal  CP, PND, orthopnea, palpitation 


GI :           No abd.pain, n/v ,diarrhea or GI bleeding .


CNS : No headache, vertigo, focal deficit.


Musculoskel :  No joint swelling ,


Derm :        No rash 


Psych :     Normal affect.


Ext :  No  swelling ,calf pain +VE LINEAR ULCER ANTERIOR SHIN WITH MACERATED 

SKIN ON MARGINS.


+VE SEROSANGUINOUS DRAINAGE





PE.


Pt. is alert awake in no distress.


V.S  As noted in the chart 


Head ,ear nose,throat and eyes : Normal.


Neck : Supple with normal carotids.


Lungs: Clear air entry.


Heart : S1 & S2 normal, HR 42-53 /MIN


Abd : Soft non tender with normal bowel sounds.


Neuro : Moves all ext. with no localized deficit.


Ext : No edema with intact pulses.Non tender calves +VE LINEAR ULCER ANTERIOR 

SHIN WITH MACERATED SKIN ON MARGINS.


+VE SEROSANGUINOUS DRAINAGE





Derm : No rashes or decubitus ulcer.





LABS/RADIOLOGY:


WOUND CULTURE +VE pseudomonas aeruginosa /MRSA.


BLOOD CULTURES 2:2 SETS -VE TO DATE X 4 DAYS.


12/24/18 CREAT 0.9/bun 10


wbc 7.2.








ASSESSMENT:


RT LEG ULCER/WOUND ( HX OF SKIN GRAFT IN PAST  )


SINUS BRADYCARDIA.


HX OF SUBSTANCE ABUSE


OPIOD WITHDRAWL








PLAN :





CONTINUE IV CEFEPIME 2GM IVPB Q12 [facility] 12/22/18 


ADD  IV VANCOMYCIN 1 G EVERY 24 HOURLY  12/23/18


F/U VANCO TROUGH LEVELS PRIOR TO THE FOURTH DOSE AND KEEP THEM BETWEEN 10 AND 

20MCG/ML


FOLLOW-UP RENAL FUNCTIONS CLOSELY.





PATIENT WILL NEED PODIATRY EVALUATION FOR CARE OF  LEG ULCERS


 local wound care.





Objective





- Vital Signs/Intake and Output


Vital Signs (last 24 hours): 


                                        











Temp Pulse Resp BP Pulse Ox


 


 98.7 F   60   20   145/77   99 


 


 12/24/18 15:06  12/24/18 15:35  12/24/18 15:06  12/24/18 15:06  12/24/18 15:06











- Medications


Medications: 


                               Current Medications





Heparin Sodium (Porcine) (Heparin)  5,000 units SC Q12 [facility]


   Last Admin: 12/24/18 09:32 Dose:  5,000 units


Hydralazine HCl (Apresoline)  12.5 mg PO TID [facility]


   Last Admin: 12/24/18 18:03 Dose:  12.5 mg


Dextrose/Sodium Chloride (Dextrose 5%/0.45% Ns 1000 Ml)  1,000 mls @ 70 mls/hr 

IV .[id number] [facility]


   Last Admin: 12/24/18 09:06 Dose:  Not Given


Cefepime HCl (Maxipime Iv 2 Gm Premix)  2 gm in 100 mls @ 200 mls/hr IVPB Q12H 

[facility]; Protocol


   Stop: 12/27/18 19:01


   Last Admin: 12/24/18 19:25 Dose:  200 mls/hr


Vancomycin/Sodium Chloride (Vancomycin 1 Gm/Ns 200 Ml)  1 gm in 200 mls @ 

133.333 mls/hr IVPB Q24H [facility]; Protocol


   Stop: 12/28/18 16:01


   Last Admin: 12/24/18 18:03 Dose:  133.333 mls/hr


Insulin Human Regular (Novolin R)  0 unit SC ACHS [facility]; Protocol


   Last Admin: 12/24/18 16:52 Dose:  Not Given


Lorazepam (Ativan)  0.5 mg PO Q8H PRN


   PRN Reason: severe anxiety or benzo withdr


Ondansetron HCl (Zofran Inj)  4 mg IVP Q8 PRN


   PRN Reason: Nausea/Vomiting


   Last Admin: 12/24/18 18:07 Dose:  4 mg


Oxycodone HCl (Oxycodone Immediate Release Tab)  30 mg PO Q6H PRN


   PRN Reason: Pain, moderate (4-7)


   Last Admin: 12/24/18 18:04 Dose:  30 mg


Potassium Chloride (K-Dur 20 Meq Er Tab)  20 meq PO DAILY [facility]


   Stop: 12/26/18 23:59


   Last Admin: 12/24/18 12:30 Dose:  20 meq


Tramadol HCl (Ultram)  50 mg PO Q8 PRN


   PRN Reason: Pain, severe (8-10)


   Last Admin: 12/23/18 08:44 Dose:  50 mg











- Labs


Labs: 


                                        





                                 12/23/18 08:14 





                                 12/24/18 06:58 











Assessment and Plan


(1) Ulcer of right lower leg


Status: Acute   





(2) Lower extremity pain, right


Status: Acute   





(3) Colitis


Status: Acute   





(4) Diabetes mellitus


Status: Acute   





(5) Sinus bradycardia


Status: Acute

## 2018-12-24 NOTE — CP.PCM.PN
Subjective





- Date & Time of Evaluation


Date of Evaluation: 12/24/18


Time of Evaluation: 14:17





- Subjective


Subjective: 





CHIEF COMPLAINTS TODAY :


Complains of generalized body pain nauseous feeling and leg pains


Echocardiogram shows normal left ventricular ejection fraction of 65% with no 

wall motion abnormality.


Heart rate is between 45-55 with normal blood pressure








ROS.


HEENT :  N.


Resp :       No cough, wheezing ,pleuritic CP ,or hemoptysis 


Cardio :     No anginal  CP, PND, orthopnea, palpitation 


GI :           No abd.pain, n/v ,diarrhea or GI bleeding .


CNS : No headache, vertigo, focal deficit.


Musculoskel :  No joint swelling ,


Derm :        No rash 


Psych :     Normal affect.


Ext :  No  swelling ,calf pain 





PE.


Pt. is alert awake in no distress.


V.S  As noted in the chart 


Head ,ear nose,throat and eyes : Normal.


Neck : Supple with normal carotids.


Lungs: Clear air entry.


Heart : S1 & S2 normal with S4. No murmur.


Abd : Soft non tender with normal bowel sounds.


Neuro : Moves all ext. with no localized deficit.


Ext : No edema with intact pulses.Non tender calves 


Derm : No rashes or decubitus ulcer.





LABS/RADIOLOGY: WOUND , MRSA ,PSEDOMONAS 





ASSESSMENT/PLAN : 


Heart rate is still between 38-40/min with no hemodynamic compromise.


IV AB 


Will add hydralazine small dose to improve heart rate





Objective





- Vital Signs/Intake and Output


Vital Signs (last 24 hours): 


                                        











Temp Pulse Resp BP Pulse Ox


 


 98.9 F   59 L  20   130/59 L  100 


 


 12/23/18 23:45  12/23/18 23:45  12/23/18 23:45  12/23/18 23:45  12/23/18 23:45








Intake and Output: 


                                        











 12/24/18 12/24/18





 11:59 23:59


 


Intake Total 560 


 


Output Total 500 


 


Balance 60 














- Medications


Medications: 


                               Current Medications





Heparin Sodium (Porcine) (Heparin)  5,000 units SC Q12 Counts include 234 beds at the [facility]


   Last Admin: 12/24/18 09:32 Dose:  5,000 units


Dextrose/Sodium Chloride (Dextrose 5%/0.45% Ns 1000 Ml)  1,000 mls @ 70 mls/hr 

IV .[id number] Counts include 234 beds at the [facility]


   Last Admin: 12/24/18 09:06 Dose:  Not Given


Cefepime HCl (Maxipime Iv 2 Gm Premix)  2 gm in 100 mls @ 200 mls/hr IVPB Q12H 

[facility]; Protocol


   Stop: 12/27/18 19:01


   Last Admin: 12/24/18 06:00 Dose:  200 mls/hr


Vancomycin/Sodium Chloride (Vancomycin 1 Gm/Ns 200 Ml)  1 gm in 200 mls @ 

133.333 mls/hr IVPB Q24H [facility]; Protocol


   Stop: 12/28/18 16:01


   Last Admin: 12/23/18 18:13 Dose:  133.333 mls/hr


Insulin Human Regular (Novolin R)  0 unit SC ACHS [facility]; Protocol


   Last Admin: 12/24/18 12:27 Dose:  Not Given


Lorazepam (Ativan)  0.5 mg PO Q8H PRN


   PRN Reason: severe anxiety or benzo withdr


Ondansetron HCl (Zofran Inj)  4 mg IVP Q8 PRN


   PRN Reason: Nausea/Vomiting


   Last Admin: 12/24/18 09:32 Dose:  4 mg


Potassium Chloride (K-Dur 20 Meq Er Tab)  20 meq PO DAILY [facility]


   Stop: 12/26/18 23:59


   Last Admin: 12/24/18 12:30 Dose:  20 meq


Tramadol HCl (Ultram)  50 mg PO Q8 PRN


   PRN Reason: Pain, severe (8-10)


   Last Admin: 12/23/18 08:44 Dose:  50 mg











- Labs


Labs: 


                                        





                                 12/23/18 08:14 





                                 12/24/18 06:58

## 2018-12-24 NOTE — CARD
--------------- APPROVED REPORT --------------





Date of service: 12/21/2018



EKG Measurement

Heart [id number]

OK [id number]

[id number]

[id number]

[id number]



<Conclusion>

Marked sinus bradycardia

Abnormal ECG

## 2018-12-25 LAB
BUN SERPL-MCNC: 8 MG/DL (ref 9–20)
CALCIUM SERPL-MCNC: 8.4 MG/DL (ref 8.6–10.4)
GFR NON-AFRICAN AMERICAN: > 60

## 2018-12-25 RX ADMIN — HUMAN INSULIN SCH UNIT: 100 INJECTION, SOLUTION SUBCUTANEOUS at 17:50

## 2018-12-25 RX ADMIN — OXYCODONE HYDROCHLORIDE PRN MG: 30 TABLET ORAL at 19:33

## 2018-12-25 RX ADMIN — DEXTROSE AND SODIUM CHLORIDE SCH MLS/HR: 5; 450 INJECTION, SOLUTION INTRAVENOUS at 13:08

## 2018-12-25 RX ADMIN — DEXTROSE AND SODIUM CHLORIDE SCH: 5; 450 INJECTION, SOLUTION INTRAVENOUS at 12:38

## 2018-12-25 RX ADMIN — HUMAN INSULIN SCH: 100 INJECTION, SOLUTION SUBCUTANEOUS at 21:39

## 2018-12-25 RX ADMIN — OXYCODONE HYDROCHLORIDE PRN MG: 30 TABLET ORAL at 03:46

## 2018-12-25 RX ADMIN — HYDRALAZINE HYDROCHLORIDE SCH MG: 25 TABLET, FILM COATED ORAL at 17:50

## 2018-12-25 RX ADMIN — HYDRALAZINE HYDROCHLORIDE SCH MG: 25 TABLET, FILM COATED ORAL at 09:59

## 2018-12-25 RX ADMIN — CEFEPIME SCH MLS/HR: 1 INJECTION, SOLUTION INTRAVENOUS at 20:22

## 2018-12-25 RX ADMIN — HUMAN INSULIN SCH: 100 INJECTION, SOLUTION SUBCUTANEOUS at 08:13

## 2018-12-25 RX ADMIN — HYDRALAZINE HYDROCHLORIDE SCH MG: 25 TABLET, FILM COATED ORAL at 13:06

## 2018-12-25 RX ADMIN — HUMAN INSULIN SCH: 100 INJECTION, SOLUTION SUBCUTANEOUS at 11:58

## 2018-12-25 RX ADMIN — POTASSIUM CHLORIDE SCH MEQ: 20 TABLET, EXTENDED RELEASE ORAL at 09:59

## 2018-12-25 RX ADMIN — VANCOMYCIN HYDROCHLORIDE SCH MLS/HR: 1 INJECTION, POWDER, LYOPHILIZED, FOR SOLUTION INTRAVENOUS at 17:54

## 2018-12-25 RX ADMIN — OXYCODONE HYDROCHLORIDE PRN MG: 30 TABLET ORAL at 13:07

## 2018-12-25 RX ADMIN — CEFEPIME SCH MLS/HR: 1 INJECTION, SOLUTION INTRAVENOUS at 06:04

## 2018-12-25 NOTE — CP.PCM.PN
Subjective





- Date & Time of Evaluation


Date of Evaluation: 12/25/18


Time of Evaluation: 15:02





- Subjective


Subjective: 





CHIEF COMPLAINTS TODAY :


Complains of generalized body pain nauseous feeling and leg pains


Echocardiogram shows normal left ventricular ejection fraction of 65% with no 

wall motion abnormality.


Heart rate is between 45-55 with normal blood pressure








ROS.


HEENT :  N.


Resp :       No cough, wheezing ,pleuritic CP ,or hemoptysis 


Cardio :     No anginal  CP, PND, orthopnea, palpitation 


GI :           No abd.pain, n/v ,diarrhea or GI bleeding .


CNS : No headache, vertigo, focal deficit.


Musculoskel :  No joint swelling ,


Derm :        No rash 


Psych :     Normal affect.


Ext :  No  swelling ,calf pain 





PE.


Pt. is alert awake in no distress.


V.S  As noted in the chart 


Head ,ear nose,throat and eyes : Normal.


Neck : Supple with normal carotids.


Lungs: Clear air entry.


Heart : S1 & S2 normal with S4. No murmur.


Abd : Soft non tender with normal bowel sounds.


Neuro : Moves all ext. with no localized deficit.


Ext : No edema with intact pulses.Non tender calves 


Derm : No rashes or decubitus ulcer.





LABS/RADIOLOGY: WOUND , MRSA ,PSEDOMONAS 





ASSESSMENT/PLAN : 


Heart rate is still between 38-40/min with no hemodynamic compromise.


IV AB 


Will add hydralazine small dose to improve heart rate





Objective





- Vital Signs/Intake and Output


Vital Signs (last 24 hours): 


                                        











Temp Pulse Resp BP Pulse Ox


 


 98.7 F   45 L  20   125/78   99 


 


 12/25/18 07:00  12/25/18 09:11  12/25/18 07:00  12/25/18 07:00  12/25/18 07:00








Intake and Output: 


                                        











 12/25/18 12/25/18





 11:59 23:59


 


Intake Total 590 


 


Output Total 100 


 


Balance 490 














- Medications


Medications: 


                               Current Medications





Hydralazine HCl (Apresoline)  12.5 mg PO TID [facility]


   Last Admin: 12/25/18 13:06 Dose:  12.5 mg


Dextrose/Sodium Chloride (Dextrose 5%/0.45% Ns 1000 Ml)  1,000 mls @ 70 mls/hr 

IV .[id number] [facility]


   Last Admin: 12/25/18 13:08 Dose:  70 mls/hr


Cefepime HCl (Maxipime Iv 2 Gm Premix)  2 gm in 100 mls @ 200 mls/hr IVPB Q12H 

[facility]; Protocol


   Stop: 12/27/18 19:01


   Last Admin: 12/25/18 06:04 Dose:  200 mls/hr


Vancomycin/Sodium Chloride (Vancomycin 1 Gm/Ns 200 Ml)  1 gm in 200 mls @ 

133.333 mls/hr IVPB Q24H [facility]; Protocol


   Stop: 12/28/18 16:01


   Last Admin: 12/24/18 18:03 Dose:  133.333 mls/hr


Insulin Human Regular (Novolin R)  0 unit SC ACHS [facility]; Protocol


   Last Admin: 12/25/18 11:58 Dose:  Not Given


Lorazepam (Ativan)  0.5 mg PO Q8H PRN


   PRN Reason: severe anxiety or benzo withdr


Ondansetron HCl (Zofran Inj)  4 mg IVP Q8 PRN


   PRN Reason: Nausea/Vomiting


   Last Admin: 12/24/18 18:07 Dose:  4 mg


Oxycodone HCl (Oxycodone Immediate Release Tab)  30 mg PO Q6H PRN


   PRN Reason: Pain, moderate (4-7)


   Last Admin: 12/25/18 13:07 Dose:  30 mg


Potassium Chloride (K-Dur 20 Meq Er Tab)  20 meq PO DAILY [facility]


   Stop: 12/26/18 23:59


   Last Admin: 12/25/18 09:59 Dose:  20 meq


Tramadol HCl (Ultram)  50 mg PO Q8 PRN


   PRN Reason: Pain, severe (8-10)


   Last Admin: 12/23/18 08:44 Dose:  50 mg











- Labs


Labs: 


                                        





                                 12/23/18 08:14 





                                 12/25/18 06:41

## 2018-12-25 NOTE — CARD
--------------- APPROVED REPORT --------------





Date of service: 12/24/2018



EXAM: Two-dimensional and M-mode echocardiogram with Doppler and 

color Doppler.



Other Information 

Quality : GoodRhythm : Bradycardia



INDICATION

Cardiomyopathy drug ABUSE



RISK FACTORS

Hypertension 

Diabetes

Smoking 



2D DIMENSIONS 

IVSd0.8   (0.7-1.1cm)Aortic Root (2D)3.4   (2.0-3.7cm)

LVDd6.4   (3.9-5.9cm)PWd0.8   (0.7-1.1cm)

LA Rjikwc18   (18-58mL)LVDs4.0   (2.5-4.0cm)

FS (%) 37.4   %LVEF (%)66.3   (>50%)

LVEF (Meek's)62.48 %IVC0.00 cm



M-Mode DIMENSIONS 

RVDd1.53   (2.1-3.2cm)Left Atrium (MM)4.69   (2.5-4.0cm)

IVSd0.80   (0.7-1.1cm)Aortic Root3.36   (2.2-3.7cm)

LVDd7.01   (4.0-5.6cm)Aortic Cusp Exc.2.27   (1.5-2.0cm)

PWd0.76   (0.7-1.1cm)FS (%) 37   %

LVDs4.41   (2.0-3.8cm)LVEF (%)66   (>50%)



Mitral Valve

MV E Xenireao81.0cm/sMV A Wxhggwll07.9cm/sE/A ratio1.4

AKFX969.04 cm/s



TDI

Lateral E' Peak V10.74cm/sMedial E' Peak V7.58cm/sE/Lateral E'8.8

E/Medial E'12.5



Tricuspid Valve

TR Peak Vzqakuql510en/sTR Peak Gr.30arHgAZQA71kzSt



 LEFT VENTRICLE 

The Left Ventricle is moderately dilated.

There is normal left ventricular wall thickness.

Left ventricle systolic function is normal.

The Ejection Fraction is 60-65%.

There is normal LV segmental wall motion.

The left ventricular diastolic function is normal.



 RIGHT VENTRICLE 

The right ventricle is normal size.

There is normal right ventricular wall thickness.

The right ventricular systolic function is normal.



 ATRIA 

The left atrium is mildly dilated.

The right atrium size is normal.

The interatrial septum is intact with no evidence for an atrial 

septal defect.



 AORTIC VALVE 

The aortic valve is normal in structure.

No aortic regurgitation is present.

There is no aortic valvular stenosis. 



 MITRAL VALVE 

The mitral valve is normal in structure.

There is no evidence of mitral valve prolapse.

There is no mitral valve stenosis.

Mitral regurgitation is mild.



 TRICUSPID VALVE 

The tricuspid valve is normal in structure.

There is moderate tricuspid regurgitation.

Right ventricular systolic pressure is estimated at less than 30 

mmHg. 

There is no pulmonary hypertension.



 PULMONIC VALVE 

The pulmonic valve is not well visualized.

There is mild pulmonic valvular regurgitation. 



 GREAT VESSELS 

The aortic root is normal in size.



 PERICARDIAL EFFUSION 

There is no significant  pericardial effusion.



<Conclusion>

Left ventricle systolic function is normal.

The Ejection Fraction is 60-65%.

No aortic regurgitation is present.

Mitral regurgitation is mild.

There is moderate tricuspid regurgitation.

There is no pulmonary hypertension.

There is mild pulmonic valvular regurgitation.

## 2018-12-26 LAB
BUN SERPL-MCNC: 10 MG/DL (ref 9–20)
CALCIUM SERPL-MCNC: 8.7 MG/DL (ref 8.6–10.4)
GFR NON-AFRICAN AMERICAN: > 60

## 2018-12-26 RX ADMIN — HYDRALAZINE HYDROCHLORIDE SCH: 25 TABLET, FILM COATED ORAL at 13:28

## 2018-12-26 RX ADMIN — HYDRALAZINE HYDROCHLORIDE SCH MG: 25 TABLET, FILM COATED ORAL at 10:40

## 2018-12-26 RX ADMIN — HYDRALAZINE HYDROCHLORIDE SCH: 25 TABLET, FILM COATED ORAL at 19:00

## 2018-12-26 RX ADMIN — OXYCODONE HYDROCHLORIDE PRN MG: 30 TABLET ORAL at 01:27

## 2018-12-26 RX ADMIN — CEFEPIME SCH MLS/HR: 1 INJECTION, SOLUTION INTRAVENOUS at 06:05

## 2018-12-26 RX ADMIN — OXYCODONE HYDROCHLORIDE PRN MG: 30 TABLET ORAL at 19:25

## 2018-12-26 RX ADMIN — HUMAN INSULIN SCH: 100 INJECTION, SOLUTION SUBCUTANEOUS at 07:49

## 2018-12-26 RX ADMIN — POTASSIUM CHLORIDE SCH MEQ: 20 TABLET, EXTENDED RELEASE ORAL at 10:40

## 2018-12-26 RX ADMIN — HUMAN INSULIN SCH: 100 INJECTION, SOLUTION SUBCUTANEOUS at 16:53

## 2018-12-26 RX ADMIN — HUMAN INSULIN SCH: 100 INJECTION, SOLUTION SUBCUTANEOUS at 22:31

## 2018-12-26 RX ADMIN — CEFEPIME SCH MLS/HR: 1 INJECTION, SOLUTION INTRAVENOUS at 20:00

## 2018-12-26 RX ADMIN — VANCOMYCIN HYDROCHLORIDE SCH MLS/HR: 1 INJECTION, POWDER, LYOPHILIZED, FOR SOLUTION INTRAVENOUS at 18:12

## 2018-12-26 RX ADMIN — OXYCODONE HYDROCHLORIDE PRN MG: 30 TABLET ORAL at 11:06

## 2018-12-26 RX ADMIN — HUMAN INSULIN SCH: 100 INJECTION, SOLUTION SUBCUTANEOUS at 12:19

## 2018-12-26 RX ADMIN — DEXTROSE AND SODIUM CHLORIDE SCH: 5; 450 INJECTION, SOLUTION INTRAVENOUS at 18:12

## 2018-12-26 NOTE — CP.PCM.CON
History of Present Illness





- History of Present Illness


History of Present Illness: 


Podiatry Consult Note - Dr. Davenport





62 y/o male with PMHx of DM, IVDA, cardiomyopathy, HTN, HLD seen at bedside for 

right non-healing leg wound. Pt states he has had the wounds for multiple years 

and has a history of healing. States he has had a bypass to the right lower 

extremity and has had multiple grafts placed on the wound. Admits to following a

doctor in [place] but says he has not gone to him in awhile. Denies noting any

pus or drainage recently from the wound. Admits to mild pain to the right leg 

wound but says it is manageable. States he was admitted for stomach pain with 

associated nausea and vomiting. Admits to 1-2 episodes of vomiting since 

hospital admission but is overall feeling better. Denies F/C/CP/SOB at present





PSHx: RLE bypass


SocHx: admits to 7-8 cigarettes/day. Admits to heroine use. Denies EtOH


FamHx: noncontributory


All: NKDA





Review of Systems





- Review of Systems


All systems: reviewed and no additional remarkable complaints except (per HPI)





Past Patient History





- Past Medical History & Family History


Past Medical History?: Yes





- Past Social History


Smoking Status: Heavy Smoker > 10 Cigarettes Daily





- CARDIAC


Hx Hypertension: Yes





- PULMONARY


Hx Respiratory Disorders: No





- NEUROLOGICAL


Hx Neurological Disorder: No





- HEENT


Hx HEENT Problems: Yes


Other/Comment: wear eyeglasses





- RENAL


Hx Chronic Kidney Disease: No





- ENDOCRINE/METABOLIC


Hx Diabetes Mellitus Type 2: Yes





- HEMATOLOGICAL/ONCOLOGICAL


Hx Blood Disorders: No





- INTEGUMENTARY


Hx Dermatological Problems: Yes


Other/Comment: Hx. rt. leg skin graft





- MUSCULOSKELETAL/RHEUMATOLOGICAL


Hx Arthritis: Yes


Hx Fractures: Yes (Right leg)





- GASTROINTESTINAL


Hx Gastrointestinal Disorders: Yes


Hx Constipation: Yes


Hx Diarrhea: Yes





- GENITOURINARY/GYNECOLOGICAL


Hx Genitourinary Disorders: No





- PSYCHIATRIC


Hx Substance Use: Yes





- SURGICAL HISTORY


Hx Orthopedic Surgery: Yes (Right leg)





- ANESTHESIA


Hx Anesthesia: Yes


Hx Anesthesia Reactions: No





Meds


Allergies/Adverse Reactions: 


                                    Allergies











Allergy/AdvReac Type Severity Reaction Status Date / Time


 


No Known Allergies Allergy   Verified 12/21/18 12:14














- Medications


Medications: 


                               Current Medications





Hydralazine HCl (Apresoline)  12.5 mg PO TID [facility]


   Last Admin: 12/26/18 13:28 Dose:  Not Given


Dextrose/Sodium Chloride (Dextrose 5%/0.45% Ns 1000 Ml)  1,000 mls @ 70 mls/hr 

IV .[id number] [facility]


   Last Admin: 12/25/18 13:08 Dose:  70 mls/hr


Cefepime HCl (Maxipime Iv 2 Gm Premix)  2 gm in 100 mls @ 200 mls/hr IVPB Q12H 

[facility]; Protocol


   Stop: 12/27/18 19:01


   Last Admin: 12/26/18 06:05 Dose:  200 mls/hr


Vancomycin/Sodium Chloride (Vancomycin 1 Gm/Ns 200 Ml)  1 gm in 200 mls @ 

133.333 mls/hr IVPB Q24H [facility]; Protocol


   Stop: 12/28/18 16:01


   Last Admin: 12/25/18 17:54 Dose:  133.333 mls/hr


Insulin Human Regular (Novolin R)  0 unit SC ACHS [facility]; Protocol


   Last Admin: 12/26/18 12:19 Dose:  Not Given


Lorazepam (Ativan)  0.5 mg PO Q8H PRN


   PRN Reason: severe anxiety or benzo withdr


Ondansetron HCl (Zofran Inj)  4 mg IVP Q8 PRN


   PRN Reason: Nausea/Vomiting


   Last Admin: 12/24/18 18:07 Dose:  4 mg


Oxycodone HCl (Oxycodone Immediate Release Tab)  30 mg PO Q6H PRN


   PRN Reason: Pain, moderate (4-7)


   Last Admin: 12/26/18 11:06 Dose:  30 mg


Potassium Chloride (K-Dur 20 Meq Er Tab)  20 meq PO DAILY [facility]


   Stop: 12/26/18 23:59


   Last Admin: 12/26/18 10:40 Dose:  20 meq


Tramadol HCl (Ultram)  50 mg PO Q8 PRN


   PRN Reason: Pain, severe (8-10)


   Last Admin: 12/23/18 08:44 Dose:  50 mg











Physical Exam





- Constitutional


Appears: Well, Non-toxic, No Acute Distress





- Extremities Exam


Additional comments: 


RLE focused exam:


 Vasc: DP/PT pulses palpable 2/4. Temperature gradient warm to cool. CFT < 3 sec

to all digits. No pedal edema noted


Derm: Open ulceration to distal 1/3 of anteromedial leg measuring approximately 

7cm x 2.5cm x 0.3cm with granular wound base, white hypopigmented wound borders.

No active drainage, no purulence, no fluctuance, no jah wound erythema, no 

probe to bone. Additional smaller ulceration attached to proximal medial corner 

of previously mentioned anteromedial ulceration with similar wound properties, 

measuring approximately 3cm x 1cm x 0.2cm. Third ulceration noted to 

anterolateral mid leg approx 6cm x 1cm x 0.2cm with similar wound properties to 

previously mentioned ulcerations


Neuro: protective sensation grossly intact


Ortho: no tenderness to palpation of ulcerations





- Neurological Exam


Neurological exam: Alert, Oriented x3





- Psychiatric Exam


Psychiatric exam: Normal Affect, Normal Mood





Results





- Vital Signs


Recent Vital Signs: 


                                Last Vital Signs











Temp  98.3 F   12/26/18 07:25


 


Pulse  52 L  12/26/18 11:38


 


Resp  18   12/26/18 07:25


 


BP  118/68   12/26/18 07:25


 


Pulse Ox  100   12/26/18 07:25














- Labs


Result Diagrams: 


                                 12/23/18 08:14





                                 12/26/18 06:42


Labs: 


                         Laboratory Results - last 24 hr











  12/25/18 12/25/18 12/25/18





  06:25 11:24 16:33


 


Sodium   


 


Potassium   


 


Chloride   


 


Carbon Dioxide   


 


Anion Gap   


 


BUN   


 


Creatinine   


 


Est GFR ( Amer)   


 


Est GFR (Non-Af Amer)   


 


POC Glucose (mg/dL)  95  118 H  170 H


 


Random Glucose   


 


Calcium   














  12/25/18 12/26/18





  21:20 06:42


 


Sodium   135


 


Potassium   3.8


 


Chloride   106


 


Carbon Dioxide   23


 


Anion Gap   10


 


BUN   10


 


Creatinine   0.9


 


Est GFR ( Amer)   > 60


 


Est GFR (Non-Af Amer)   > 60


 


POC Glucose (mg/dL)  112 H 


 


Random Glucose   114 H


 


Calcium   8.7














Assessment & Plan





- Assessment and Plan (Free Text)


Assessment: 


62 y/o male with right lower extremity leg wounds





Plan


Pt seen and evaluated at bedside


Discussed plan with attending Dr. Davenport


Wound cx shows growth of Pseudomonas aeruginosa and MRSA


Continue IV abx - Cefepime, Vancomycin


Podiatry to continue with local wound care


No surgical intervention planned at this time


Will continue to follow patient while in house

## 2018-12-26 NOTE — CP.PCM.PN
Subjective





- Date & Time of Evaluation


Date of Evaluation: 12/26/18


Time of Evaluation: 14:24





- Subjective


Subjective: 





CHIEF COMPLAINTS TODAY :


AFEBRILE 


VS as noted.


+ve pain rt.LE





ROS.


HEENT :  N.


Resp :       No cough, wheezing ,pleuritic CP ,or hemoptysis 


Cardio :     No anginal  CP, PND, orthopnea, palpitation 


GI :           No abd.pain, n/v ,diarrhea or GI bleeding .


CNS : No headache, vertigo, focal deficit.


Musculoskel :  No joint swelling ,


Derm :        No rash 


Psych :     Normal affect.


Ext :  No  swelling ,calf pain +VE LINEAR ULCER ANTERIOR SHIN WITH MACERATED 

SKIN ON MARGINS.


+VE SEROSANGUINOUS DRAINAGE





PE.


Pt. is alert awake in no distress.


V.S  As noted in the chart 


Head ,ear nose,throat and eyes : Normal.


Neck : Supple with normal carotids.


Lungs: Clear air entry.


Heart : S1 & S2 normal, HR 42-53 /MIN


Abd : Soft non tender with normal bowel sounds.


Neuro : Moves all ext. with no localized deficit.


Ext : No edema with intact pulses.Non tender calves +VE LINEAR ULCER ANTERIOR 

SHIN WITH MACERATED SKIN ON MARGINS.


+VE SEROSANGUINOUS DRAINAGE





Derm : No rashes or decubitus ulcer.





LABS/RADIOLOGY:


WOUND CULTURE +VE pseudomonas aeruginosa /MRSA.





BLOOD CULTURES 2:2 SETS -VE TO DATE X 4 DAYS.


vanco trough leve 5.2 low





ASSESSMENT:


RT LEG ULCER/WOUND ( HX OF SKIN GRAFT IN PAST  )


SINUS BRADYCARDIA.


HX OF SUBSTANCE ABUSE


OPIOD WITHDRAWL








PLAN :





CONTINUE IV CEFEPIME 2GM IVPB Q12 HRLY 12/22/18 


INCREASE IV VANCOMYCIN 1 G EVERY 12  HOURLY  12/23/18


F/U VANCO TROUGH LEVELS PRIOR TO THE FOURTH DOSE AND KEEP THEM BETWEEN 10 AND 

20MCG/ML


FOLLOW-UP RENAL FUNCTIONS CLOSELY.





 PODIATRY EVALUATION FOR CARE OF  LEG ULCERS IN PROGRESS.


 local wound care.


contact precautions.








Objective





- Vital Signs/Intake and Output


Vital Signs (last 24 hours): 


                                        











Temp Pulse Resp BP Pulse Ox


 


 98.3 F   52 L  18   118/68   100 


 


 12/26/18 07:25  12/26/18 11:38  12/26/18 07:25  12/26/18 07:25  12/26/18 07:25








Intake and Output: 


                                        











 12/26/18 12/26/18





 06:59 18:59


 


Intake Total 720 


 


Balance 720 














- Medications


Medications: 


                               Current Medications





Hydralazine HCl (Apresoline)  12.5 mg PO TID [facility]


   Last Admin: 12/26/18 13:28 Dose:  Not Given


Dextrose/Sodium Chloride (Dextrose 5%/0.45% Ns 1000 Ml)  1,000 mls @ 70 mls/hr 

IV .[id number] [facility]


   Last Admin: 12/25/18 13:08 Dose:  70 mls/hr


Cefepime HCl (Maxipime Iv 2 Gm Premix)  2 gm in 100 mls @ 200 mls/hr IVPB Q12H 

[facility]; Protocol


   Stop: 12/27/18 19:01


   Last Admin: 12/26/18 06:05 Dose:  200 mls/hr


Vancomycin/Sodium Chloride (Vancomycin 1 Gm/Ns 200 Ml)  1 gm in 200 mls @ 

133.333 mls/hr IVPB Q24H [facility]; Protocol


   Stop: 12/28/18 16:01


   Last Admin: 12/25/18 17:54 Dose:  133.333 mls/hr


Insulin Human Regular (Novolin R)  0 unit SC ACHS [facility]; Protocol


   Last Admin: 12/26/18 12:19 Dose:  Not Given


Lorazepam (Ativan)  0.5 mg PO Q8H PRN


   PRN Reason: severe anxiety or benzo withdr


Ondansetron HCl (Zofran Inj)  4 mg IVP Q8 PRN


   PRN Reason: Nausea/Vomiting


   Last Admin: 12/24/18 18:07 Dose:  4 mg


Oxycodone HCl (Oxycodone Immediate Release Tab)  30 mg PO Q6H PRN


   PRN Reason: Pain, moderate (4-7)


   Last Admin: 12/26/18 11:06 Dose:  30 mg


Potassium Chloride (K-Dur 20 Meq Er Tab)  20 meq PO DAILY [facility]


   Stop: 12/26/18 23:59


   Last Admin: 12/26/18 10:40 Dose:  20 meq


Tramadol HCl (Ultram)  50 mg PO Q8 PRN


   PRN Reason: Pain, severe (8-10)


   Last Admin: 12/23/18 08:44 Dose:  50 mg











- Labs


Labs: 


                                        





                                 12/23/18 08:14 





                                 12/26/18 06:42 











Assessment and Plan


(1) Ulcer of right lower leg


Status: Acute   





(2) Lower extremity pain, right


Status: Acute   





(3) Colitis


Status: Acute   





(4) Diabetes mellitus


Status: Acute   





(5) Sinus bradycardia


Status: Acute

## 2018-12-26 NOTE — RAD
Date of service: 



12/26/2018



PROCEDURE:  Radiographs of the right tibia and fibula.



HISTORY:

R/O OSTEO



COMPARISON:

None available



TECHNIQUE:

Frontal and lateral views obtained.



FINDINGS:



BONES:

No fracture or destructive lesion.



JOINT SPACES:

There is tricompartmental osteoarthritis of the right knee.



OTHER FINDINGS:

Surgical clips are seen over the medial aspect of the tibia.



IMPRESSION:

No acute fracture.  Osteoarthritis of the right knee

## 2018-12-27 VITALS — RESPIRATION RATE: 20 BRPM

## 2018-12-27 LAB
ALBUMIN SERPL-MCNC: 3.9 G/DL (ref 3.5–5)
ALBUMIN/GLOB SERPL: 1.3 {RATIO} (ref 1–2.1)
ALT SERPL-CCNC: 19 U/L (ref 21–72)
AST SERPL-CCNC: 25 U/L (ref 17–59)
BASOPHILS # BLD AUTO: 0 K/UL (ref 0–0.2)
BASOPHILS NFR BLD: 0.5 % (ref 0–2)
BUN SERPL-MCNC: 9 MG/DL (ref 9–20)
CALCIUM SERPL-MCNC: 8.9 MG/DL (ref 8.6–10.4)
EOSINOPHIL # BLD AUTO: 0.1 K/UL (ref 0–0.7)
EOSINOPHIL NFR BLD: 1.2 % (ref 0–4)
ERYTHROCYTE [DISTWIDTH] IN BLOOD BY AUTOMATED COUNT: 16.8 % (ref 11.5–14.5)
GFR NON-AFRICAN AMERICAN: > 60
HGB BLD-MCNC: 12.3 G/DL (ref 12–18)
LYMPHOCYTES # BLD AUTO: 1.8 K/UL (ref 1–4.3)
LYMPHOCYTES NFR BLD AUTO: 29.5 % (ref 20–40)
MCH RBC QN AUTO: 29.8 PG (ref 27–31)
MCHC RBC AUTO-ENTMCNC: 33.4 G/DL (ref 33–37)
MCV RBC AUTO: 89.3 FL (ref 80–94)
MONOCYTES # BLD: 0.6 K/UL (ref 0–0.8)
MONOCYTES NFR BLD: 10.5 % (ref 0–10)
NEUTROPHILS # BLD: 3.6 K/UL (ref 1.8–7)
NEUTROPHILS NFR BLD AUTO: 58.3 % (ref 50–75)
NRBC BLD AUTO-RTO: 0.1 % (ref 0–2)
PLATELET # BLD: 281 K/UL (ref 130–400)
PMV BLD AUTO: 8.5 FL (ref 7.2–11.7)
RBC # BLD AUTO: 4.13 MIL/UL (ref 4.4–5.9)
WBC # BLD AUTO: 6.1 K/UL (ref 4.8–10.8)

## 2018-12-27 RX ADMIN — VANCOMYCIN HYDROCHLORIDE SCH MLS/HR: 1 INJECTION, POWDER, LYOPHILIZED, FOR SOLUTION INTRAVENOUS at 16:32

## 2018-12-27 RX ADMIN — HUMAN INSULIN SCH: 100 INJECTION, SOLUTION SUBCUTANEOUS at 11:45

## 2018-12-27 RX ADMIN — HUMAN INSULIN SCH: 100 INJECTION, SOLUTION SUBCUTANEOUS at 21:08

## 2018-12-27 RX ADMIN — HYDRALAZINE HYDROCHLORIDE SCH: 25 TABLET, FILM COATED ORAL at 18:42

## 2018-12-27 RX ADMIN — HYDRALAZINE HYDROCHLORIDE SCH: 25 TABLET, FILM COATED ORAL at 14:42

## 2018-12-27 RX ADMIN — CEFEPIME SCH MLS/HR: 1 INJECTION, SOLUTION INTRAVENOUS at 18:38

## 2018-12-27 RX ADMIN — OXYCODONE HYDROCHLORIDE PRN MG: 30 TABLET ORAL at 11:36

## 2018-12-27 RX ADMIN — HYDRALAZINE HYDROCHLORIDE SCH: 25 TABLET, FILM COATED ORAL at 09:41

## 2018-12-27 RX ADMIN — OXYCODONE HYDROCHLORIDE PRN MG: 30 TABLET ORAL at 18:37

## 2018-12-27 RX ADMIN — OXYCODONE HYDROCHLORIDE PRN MG: 30 TABLET ORAL at 02:06

## 2018-12-27 RX ADMIN — VANCOMYCIN HYDROCHLORIDE SCH MLS/HR: 1 INJECTION, POWDER, LYOPHILIZED, FOR SOLUTION INTRAVENOUS at 04:19

## 2018-12-27 RX ADMIN — CEFEPIME SCH MLS/HR: 1 INJECTION, SOLUTION INTRAVENOUS at 06:23

## 2018-12-27 RX ADMIN — HUMAN INSULIN SCH: 100 INJECTION, SOLUTION SUBCUTANEOUS at 07:51

## 2018-12-27 RX ADMIN — HUMAN INSULIN SCH: 100 INJECTION, SOLUTION SUBCUTANEOUS at 17:00

## 2018-12-27 NOTE — CP.PCM.PN
Subjective





- Date & Time of Evaluation


Date of Evaluation: 12/27/18


Time of Evaluation: 13:54





- Subjective


Subjective: 





CHIEF COMPLAINTS TODAY :


Complains of generalized body pain nauseous feeling and leg pains


Echocardiogram shows normal left ventricular ejection fraction of 65% with no 

wall motion abnormality.


Heart rate is between 45-55 with normal blood pressure








ROS.


HEENT :  N.


Resp :       No cough, wheezing ,pleuritic CP ,or hemoptysis 


Cardio :     No anginal  CP, PND, orthopnea, palpitation 


GI :           No abd.pain, n/v ,diarrhea or GI bleeding .


CNS : No headache, vertigo, focal deficit.


Musculoskel :  No joint swelling ,


Derm :        No rash 


Psych :     Normal affect.


Ext :  No  swelling ,calf pain 





PE.


Pt. is alert awake in no distress.


V.S  As noted in the chart 


Head ,ear nose,throat and eyes : Normal.


Neck : Supple with normal carotids.


Lungs: Clear air entry.


Heart : S1 & S2 normal with S4. No murmur.


Abd : Soft non tender with normal bowel sounds.


Neuro : Moves all ext. with no localized deficit.


Ext : No edema with intact pulses.Non tender calves 


Derm : No rashes or decubitus ulcer.





LABS/RADIOLOGY: WOUND , MRSA ,PSEDOMONAS 





ASSESSMENT/PLAN : 


Heart rate is still between 38-40/min with no hemodynamic compromise.


IV AB 


Will add hydralazine small dose to improve heart rate





Objective





- Vital Signs/Intake and Output


Vital Signs (last 24 hours): 


                                        











Temp Pulse Resp BP Pulse Ox


 


 99.0 F   54 L  20   92/43 L  97 


 


 12/27/18 07:00  12/27/18 09:40  12/27/18 07:00  12/27/18 09:40  12/27/18 09:06








Intake and Output: 


                                        











 12/27/18 12/27/18





 11:59 23:59


 


Intake Total 995 


 


Output Total 600 


 


Balance 395 














- Medications


Medications: 


                               Current Medications





Heparin Sodium (Porcine) (Heparin)  5,000 units SC Q12H [facility]


Hydralazine HCl (Apresoline)  12.5 mg PO TID [facility]


   Last Admin: 12/27/18 09:41 Dose:  Not Given


Dextrose/Sodium Chloride (Dextrose 5%/0.45% Ns 1000 Ml)  1,000 mls @ 70 mls/hr 

IV .[id number] [facility]


   Last Admin: 12/26/18 18:12 Dose:  Not Given


Cefepime HCl (Maxipime Iv 2 Gm Premix)  2 gm in 100 mls @ 200 mls/hr IVPB Q12H 

[facility]; Protocol


   Stop: 12/27/18 19:01


   Last Admin: 12/27/18 06:23 Dose:  200 mls/hr


Vancomycin/Sodium Chloride (Vancomycin 1 Gm/Ns 200 Ml)  1 gm in 200 mls @ 

133.333 mls/hr IVPB Q12H [facility]; Protocol


   Stop: 12/31/18 17:01


   Last Admin: 12/27/18 04:19 Dose:  133.333 mls/hr


Insulin Human Regular (Novolin R)  0 unit SC ACHS [facility]; Protocol


   Last Admin: 12/27/18 11:45 Dose:  Not Given


Lorazepam (Ativan)  0.5 mg PO Q8H PRN


   PRN Reason: severe anxiety or benzo withdr


Ondansetron HCl (Zofran Inj)  4 mg IVP Q8 PRN


   PRN Reason: Nausea/Vomiting


   Last Admin: 12/24/18 18:07 Dose:  4 mg


Oxycodone HCl (Oxycodone Immediate Release Tab)  30 mg PO Q6H PRN


   PRN Reason: Pain, moderate (4-7)


   Last Admin: 12/27/18 11:36 Dose:  30 mg


Tramadol HCl (Ultram)  50 mg PO Q8 PRN


   PRN Reason: Pain, severe (8-10)


   Last Admin: 12/23/18 08:44 Dose:  50 mg











- Labs


Labs: 


                                        





                                 12/27/18 13:39 





                                 12/26/18 06:42

## 2018-12-27 NOTE — CP.PCM.PN
Subjective





- Date & Time of Evaluation


Date of Evaluation: 12/27/18


Time of Evaluation: 22:50





- Subjective


Subjective: 





CHIEF COMPLAINTS TODAY :


12/27/18


AFEBRILE 


VS as noted.


comfortable


seen by Dr Davenport Podiatry.





ROS.


HEENT :  N.


Resp :       No cough, wheezing ,pleuritic CP ,or hemoptysis 


Cardio :     No anginal  CP, PND, orthopnea, palpitation 


GI :           No abd.pain, n/v ,diarrhea or GI bleeding .


CNS : No headache, vertigo, focal deficit.


Musculoskel :  No joint swelling ,


Derm :        No rash 


Psych :     Normal affect.


Ext :  No  swelling ,calf pain +VE LINEAR ULCERS ANTERIOR SHIN WITH MACERATED 

SKIN ON MARGINS.


+VE SEROSANGUINOUS DRAINAGE





PE.


Pt. is alert awake in no distress.


V.S  As noted in the chart 


Head ,ear nose,throat and eyes : Normal.


Neck : Supple with normal carotids.


Lungs: Clear air entry.


Heart : S1 & S2 normal, HR 42-53 /MIN


Abd : Soft non tender with normal bowel sounds.


Neuro : Moves all ext. with no localized deficit.


Ext : No edema with intact pulses.Non tender calves +VE LINEAR ULCERS ANTERIOR 

SHIN WITH MACERATED SKIN ON MARGINS.


+VE SEROSANGUINOUS DRAINAGE





Derm : No rashes or decubitus ulcer.





LABS/RADIOLOGY:


WOUND CULTURE +VE pseudomonas aeruginosa S- CIPRO, CEFEPIME


 /MRSA. S-CIPRO, CLEOCIN, VANCOMYCIN





BLOOD CULTURES 2:2 SETS -VE TO DATE X 4 DAYS.


RENAL FUNCTION -OK





ASSESSMENT:


RT LEG ULCER/WOUND ( HX OF SKIN GRAFT IN PAST / SURGERY IN PAST. )


SINUS BRADYCARDIA.


HX OF SUBSTANCE ABUSE


OPIOD WITHDRAWL








PLAN :





CONTINUE IV CEFEPIME 2GM IVPB Q12 HRLY 12/22/18 


INCREASE IV VANCOMYCIN 1 G EVERY 12  HOURLY  12/23/18


F/U VANCO TROUGH LEVELS PRIOR TO THE FOURTH DOSE AND KEEP THEM BETWEEN 10 AND 

20MCG/ML


FOLLOW-UP RENAL FUNCTIONS CLOSELY.





 PODIATRY EVALUATION FOR CARE OF  LEG ULCERS IN PROGRESS.


 local wound care AS PER PODIATRY.


contact precautions.


AS PER PODIATRY ULCERS ARE SUPERFICIAL,WILL DISCUSS FURTHER PLANS WITH PATIENT 

AS OUT PATIENT TREATMENT.


 ? [facility] VS HOME.





CASE DISCUSSED WITH STAFF.











Objective





- Vital Signs/Intake and Output


Vital Signs (last 24 hours): 


                                        











Temp Pulse Resp BP Pulse Ox


 


 98.2 F   52 L  20   102/61   99 


 


 12/27/18 17:02  12/27/18 17:02  12/27/18 17:02  12/27/18 17:02  12/27/18 17:02











- Medications


Medications: 


                               Current Medications





Heparin Sodium (Porcine) (Heparin)  5,000 units SC Q12H [facility]


   Last Admin: 12/27/18 21:55 Dose:  5,000 units


Hydralazine HCl (Apresoline)  12.5 mg PO TID [facility]


   Last Admin: 12/27/18 18:42 Dose:  Not Given


Dextrose/Sodium Chloride (Dextrose 5%/0.45% Ns 1000 Ml)  1,000 mls @ 70 mls/hr 

IV .[id number] [facility]


   Last Admin: 12/26/18 18:12 Dose:  Not Given


Vancomycin/Sodium Chloride (Vancomycin 1 Gm/Ns 200 Ml)  1 gm in 200 mls @ 

133.333 mls/hr IVPB Q12H [facility]; Protocol


   Stop: 12/31/18 17:01


   Last Admin: 12/27/18 16:32 Dose:  133.333 mls/hr


Insulin Human Regular (Novolin R)  0 unit SC ACHS [facility]; Protocol


   Last Admin: 12/27/18 21:08 Dose:  Not Given


Lorazepam (Ativan)  0.5 mg PO Q8H PRN


   PRN Reason: severe anxiety or benzo withdr


Ondansetron HCl (Zofran Inj)  4 mg IVP Q8 PRN


   PRN Reason: Nausea/Vomiting


   Last Admin: 12/24/18 18:07 Dose:  4 mg


Oxycodone HCl (Oxycodone Immediate Release Tab)  30 mg PO Q6H PRN


   PRN Reason: Pain, moderate (4-7)


   Last Admin: 12/27/18 18:37 Dose:  30 mg


Tramadol HCl (Ultram)  50 mg PO Q8 PRN


   PRN Reason: Pain, severe (8-10)


   Last Admin: 12/23/18 08:44 Dose:  50 mg











- Labs


Labs: 


                                        





                                 12/27/18 13:39 





                                 12/27/18 13:39 











Assessment and Plan


(1) Ulcer of right lower leg


Status: Acute   





(2) Lower extremity pain, right


Status: Acute   





(3) Colitis


Status: Acute   





(4) Diabetes mellitus


Status: Acute   





(5) Sinus bradycardia


Status: Acute

## 2018-12-27 NOTE — CP.PCM.PN
Subjective





- Date & Time of Evaluation


Date of Evaluation: 12/27/18


Time of Evaluation: 20:47





- Subjective


Subjective: 


64 y/o male seen at bedside today with attending Dr. Davenport for right leg ulcers.

He denies any pain to the right leg today. States he just wants to go home. 

States he is no longer having nausea, vomiting or abdominal pain. Resting comfor

tably in bed during visit. Dressings to right foot intact. No F/C/CP/SOB





Objective





- Vital Signs/Intake and Output


Vital Signs (last 24 hours): 


                                        











Temp Pulse Resp BP Pulse Ox


 


 99.0 F   59 L  20   92/43 L  97 


 


 12/27/18 07:00  12/27/18 15:19  12/27/18 07:00  12/27/18 09:40  12/27/18 09:06








Intake and Output: 


                                        











 12/27/18 12/27/18





 06:59 18:59


 


Intake Total 995 


 


Output Total 750 


 


Balance 245 














- Medications


Medications: 


                               Current Medications





Heparin Sodium (Porcine) (Heparin)  5,000 units SC Q12H Replaced by [facility]


Hydralazine HCl (Apresoline)  12.5 mg PO TID Replaced by [facility]


   Last Admin: 12/27/18 14:42 Dose:  Not Given


Dextrose/Sodium Chloride (Dextrose 5%/0.45% Ns 1000 Ml)  1,000 mls @ 70 mls/hr 

IV .[id number] Replaced by [facility]


   Last Admin: 12/26/18 18:12 Dose:  Not Given


Cefepime HCl (Maxipime Iv 2 Gm Premix)  2 gm in 100 mls @ 200 mls/hr IVPB Q12H 

Replaced by [facility]; Protocol


   Stop: 12/27/18 19:01


   Last Admin: 12/27/18 06:23 Dose:  200 mls/hr


Vancomycin/Sodium Chloride (Vancomycin 1 Gm/Ns 200 Ml)  1 gm in 200 mls @ 

133.333 mls/hr IVPB Q12H Replaced by [facility]; Protocol


   Stop: 12/31/18 17:01


   Last Admin: 12/27/18 16:32 Dose:  133.333 mls/hr


Insulin Human Regular (Novolin R)  0 unit SC ACHS Replaced by [facility]; Protocol


   Last Admin: 12/27/18 11:45 Dose:  Not Given


Lorazepam (Ativan)  0.5 mg PO Q8H PRN


   PRN Reason: severe anxiety or benzo withdr


Ondansetron HCl (Zofran Inj)  4 mg IVP Q8 PRN


   PRN Reason: Nausea/Vomiting


   Last Admin: 12/24/18 18:07 Dose:  4 mg


Oxycodone HCl (Oxycodone Immediate Release Tab)  30 mg PO Q6H PRN


   PRN Reason: Pain, moderate (4-7)


   Last Admin: 12/27/18 11:36 Dose:  30 mg


Tramadol HCl (Ultram)  50 mg PO Q8 PRN


   PRN Reason: Pain, severe (8-10)


   Last Admin: 12/23/18 08:44 Dose:  50 mg











- Labs


Labs: 


                                        





                                 12/27/18 13:39 





                                 12/27/18 13:39 











- Constitutional


Appears: Well, Non-toxic, No Acute Distress





- Extremities Exam


Additional comments: 


RLE focused exam:


 Vasc: DP/PT pulses palpable 2/4. Temperature gradient warm to cool. CFT < 3 sec

to all digits. No pedal edema noted


Derm: Open ulceration to distal 1/3 of anteromedial leg measuring approximately 

7cm x 2.5cm x 0.3cm with granular wound base, white hypopigmented wound borders.

No active drainage, no purulence, no fluctuance, no jah wound erythema, no pro

be to bone. Additional smaller ulceration attached to proximal medial corner of 

previously mentioned anteromedial ulceration with similar wound properties, 

measuring approximately 3cm x 1cm x 0.2cm. Third ulceration noted to 

anterolateral mid leg approx 6cm x 1cm x 0.2cm with similar wound properties to 

previously mentioned ulcerations


Neuro: protective sensation grossly intact


Ortho: no tenderness to palpation of ulcerations








- Neurological Exam


Neurological Exam: Alert, Awake, Oriented x3





- Psychiatric Exam


Psychiatric exam: Normal Affect, Normal Mood





Assessment and Plan





- Assessment and Plan (Free Text)


Assessment: 


64 y/o male with right lower extremity leg wounds





Plan


Pt seen and evaluated at bedside with attending Dr. Davenport


Wound cx R leg + for Pseudomonas aeruginosa and MRSA


Continue IV abx - Cefepime, Vancomycin


Podiatry to continue with local wound care


No surgical intervention planned at this time


Will continue to follow patient while in house


Pt is stable from podiatry standpoint - can follow up with Dr. Davenport as 

outpatient in office or [place] wound [facility] [place] within 1 week of discharge

## 2018-12-28 VITALS
OXYGEN SATURATION: 97 % | TEMPERATURE: 99 F | SYSTOLIC BLOOD PRESSURE: 125 MMHG | DIASTOLIC BLOOD PRESSURE: 57 MMHG | HEART RATE: 60 BPM

## 2018-12-28 LAB
ALBUMIN SERPL-MCNC: 3.6 G/DL (ref 3.5–5)
ALBUMIN/GLOB SERPL: 1.3 {RATIO} (ref 1–2.1)
ALT SERPL-CCNC: 24 U/L (ref 21–72)
AST SERPL-CCNC: 23 U/L (ref 17–59)
BASOPHILS # BLD AUTO: 0 K/UL (ref 0–0.2)
BASOPHILS NFR BLD: 0.5 % (ref 0–2)
BUN SERPL-MCNC: 9 MG/DL (ref 9–20)
CALCIUM SERPL-MCNC: 8.9 MG/DL (ref 8.6–10.4)
EOSINOPHIL # BLD AUTO: 0.1 K/UL (ref 0–0.7)
EOSINOPHIL NFR BLD: 1.3 % (ref 0–4)
ERYTHROCYTE [DISTWIDTH] IN BLOOD BY AUTOMATED COUNT: 16.8 % (ref 11.5–14.5)
GFR NON-AFRICAN AMERICAN: > 60
HGB BLD-MCNC: 11.5 G/DL (ref 12–18)
LYMPHOCYTES # BLD AUTO: 2.5 K/UL (ref 1–4.3)
LYMPHOCYTES NFR BLD AUTO: 37.5 % (ref 20–40)
MCH RBC QN AUTO: 30.3 PG (ref 27–31)
MCHC RBC AUTO-ENTMCNC: 34.1 G/DL (ref 33–37)
MCV RBC AUTO: 88.8 FL (ref 80–94)
MONOCYTES # BLD: 0.7 K/UL (ref 0–0.8)
MONOCYTES NFR BLD: 9.9 % (ref 0–10)
NEUTROPHILS # BLD: 3.4 K/UL (ref 1.8–7)
NEUTROPHILS NFR BLD AUTO: 50.8 % (ref 50–75)
NRBC BLD AUTO-RTO: 0.1 % (ref 0–2)
PLATELET # BLD: 247 K/UL (ref 130–400)
PMV BLD AUTO: 8.4 FL (ref 7.2–11.7)
RBC # BLD AUTO: 3.82 MIL/UL (ref 4.4–5.9)
WBC # BLD AUTO: 6.6 K/UL (ref 4.8–10.8)

## 2018-12-28 RX ADMIN — OXYCODONE HYDROCHLORIDE PRN MG: 30 TABLET ORAL at 01:13

## 2018-12-28 RX ADMIN — HYDRALAZINE HYDROCHLORIDE SCH: 25 TABLET, FILM COATED ORAL at 09:20

## 2018-12-28 RX ADMIN — VANCOMYCIN HYDROCHLORIDE SCH MLS/HR: 1 INJECTION, POWDER, LYOPHILIZED, FOR SOLUTION INTRAVENOUS at 05:24

## 2018-12-28 RX ADMIN — OXYCODONE HYDROCHLORIDE PRN MG: 30 TABLET ORAL at 09:13

## 2018-12-28 RX ADMIN — HUMAN INSULIN SCH: 100 INJECTION, SOLUTION SUBCUTANEOUS at 07:24

## 2018-12-29 NOTE — CP.PCM.DIS
Provider





- Provider


Date of Admission: 


12/21/18 16:09





Attending physician: 


Louis Grant MD





Consults: 








12/21/18 20:01


Infectious Disease Consult Routine 


   Comment: 


   Consulting Provider: Roscoe Marrero


   Consulting Physician: Roscoe Marrero


   Reason for Consult: DM foot infection





12/22/18 03:15


Social Work Referral Routine 


   Comment: lewis score 7


   Physician Instructions: 


   Reason For Exam: lewis score 7





12/22/18 07:50


Wound Care [Nursing Referral for Wound Care] Routine 


   Comment: 


   Physician Instructions: 


   Reason For Exam: ulcer to right foot pt





12/22/18 13:32


Psychiatry Consult Routine 


   Comment: 


   Consulting Provider: Luc Garcia


   Consulting Physician: Luc Garcia


   Reason for Consult: Possible withdrawal





12/26/18 09:13


Podiatry Consult Routine 


   Comment: 


   Consulting Provider: Júnior Davenport


   Consulting Physician: Júnior Davenport


   Reason for Consult: foot ulcer











Time Spent in preparation of Discharge (in minutes): 35





Hospital Course





- Lab Results


Lab Results: 


                                  Micro Results





12/21/18 12:35   Blood   Blood Culture - Final


                            NO GROWTH AFTER 5 DAYS


12/21/18 12:35   Blood   Gram Stain - Final


                            TEST NOT PERFORMED


12/21/18 13:05   Blood   Blood Culture - Final


                            NO GROWTH AFTER 5 DAYS


12/21/18 13:05   Blood   Gram Stain - Final


                            TEST NOT PERFORMED


12/21/18 01:53   Leg - Right   Gram Stain - Final


12/21/18 01:53   Leg - Right   Wound Culture - Final


                            Pseudomonas Aeruginosa


                            Methicillin Resistant S Aureus


12/22/18 06:00   Naris   MRSA Culture (Admit) - Final


                            MRSA NOT DETECTED





                             Most Recent Lab Values











WBC  6.6 K/uL (4.8-10.8)   12/28/18  08:03    


 


RBC  3.82 Mil/uL (4.40-5.90)  L  12/28/18  08:03    


 


Hgb  11.5 g/dL (12.0-18.0)  L  12/28/18  08:03    


 


Hct  33.9 % (35.0-51.0)  L  12/28/18  08:03    


 


MCV  88.8 fL (80.0-94.0)   12/28/18  08:03    


 


MCH  30.3 pg (27.0-31.0)   12/28/18  08:03    


 


MCHC  34.1 g/dL (33.0-37.0)   12/28/18  08:03    


 


RDW  16.8 % (11.5-14.5)  H  12/28/18  08:03    


 


Plt Count  247 K/uL (130-400)   12/28/18  08:03    


 


MPV  8.4 fL (7.2-11.7)   12/28/18  08:03    


 


Neut % (Auto)  50.8 % (50.0-75.0)   12/28/18  08:03    


 


Lymph % (Auto)  37.5 % (20.0-40.0)   12/28/18  08:03    


 


Mono % (Auto)  9.9 % (0.0-10.0)   12/28/18  08:03    


 


Eos % (Auto)  1.3 % (0.0-4.0)   12/28/18  08:03    


 


Baso % (Auto)  0.5 % (0.0-2.0)   12/28/18  08:03    


 


Neut # (Auto)  3.4 K/uL (1.8-7.0)   12/28/18  08:03    


 


Lymph # (Auto)  2.5 K/uL (1.0-4.3)   12/28/18  08:03    


 


Mono # (Auto)  0.7 K/uL (0.0-0.8)   12/28/18  08:03    


 


Eos # (Auto)  0.1 K/uL (0.0-0.7)   12/28/18  08:03    


 


Baso # (Auto)  0.0 K/uL (0.0-0.2)   12/28/18  08:03    


 


ESR  20 mm/hr (0-15)  H  12/22/18  07:58    


 


Sodium  138 mmol/L (132-148)   12/28/18  08:03    


 


Potassium  3.6 mmol/L (3.6-5.2)   12/28/18  08:03    


 


Chloride  109 mmol/L ()  H  12/28/18  08:03    


 


Carbon Dioxide  23 mmol/L (22-30)   12/28/18  08:03    


 


Anion Gap  10  (10-20)   12/28/18  08:03    


 


BUN  9 mg/dL (9-20)   12/28/18  08:03    


 


Creatinine  0.7 mg/dL (0.8-1.5)  L  12/28/18  08:03    


 


Est GFR ( Amer)  > 60   12/28/18  08:03    


 


Est GFR (Non-Af Amer)  > 60   12/28/18  08:03    


 


POC Glucose (mg/dL)  93 mg/dL ()   12/28/18  06:12    


 


Random Glucose  89 mg/dL ()  D 12/28/18  08:03    


 


Calcium  8.9 mg/dl (8.6-10.4)   12/28/18  08:03    


 


Phosphorus  3.1 mg/dL (2.5-4.5)   12/27/18  13:39    


 


Magnesium  1.7 mg/dL (1.6-2.3)   12/27/18  13:39    


 


Total Bilirubin  0.4 mg/dL (0.2-1.3)   12/28/18  08:03    


 


AST  23 U/L (17-59)   12/28/18  08:03    


 


ALT  24 U/L (21-72)   12/28/18  08:03    


 


Alkaline Phosphatase  65 U/L ()   12/28/18  08:03    


 


Total Creatine Kinase  139 U/L ()   12/21/18  13:03    


 


CK-MB (Mass)  1.30 ng/mL (0.0-3.38)   12/21/18  13:03    


 


Troponin I  < 0.0120 ng/mL (0.00-0.120)   12/21/18  13:03    


 


C-React Prot High Sens  1.32 mg/L (1.00-3.00)   12/22/18  08:01    


 


Total Protein  6.3 g/dL (6.3-8.3)   12/28/18  08:03    


 


Albumin  3.6 g/dL (3.5-5.0)   12/28/18  08:03    


 


Globulin  2.8 gm/dL (2.2-3.9)   12/28/18  08:03    


 


Albumin/Globulin Ratio  1.3  (1.0-2.1)   12/28/18  08:03    


 


Lipase  65 U/L ()   12/21/18  13:03    


 


Free T4  0.95 ng/dL (0.78-2.19)   12/22/18  08:01    


 


TSH 3rd Generation  0.94 mIU/L (0.46-4.68)   12/22/18  08:01    


 


Urine Color  Clau  (YELLOW)   12/21/18  14:39    


 


Urine Clarity  Clear  (Clear)   12/21/18  14:39    


 


Urine pH  5.0  (5.0-8.0)   12/21/18  14:39    


 


Ur Specific Gravity  1.033  (1.003-1.030)  H  12/21/18  14:39    


 


Urine Protein  2+ mg/dL (NEGATIVE)  H  12/21/18  14:39    


 


Urine Glucose (UA)  Normal mg/dL (Normal)   12/21/18  14:39    


 


Urine Ketones  1+ mg/dL (NEGATIVE)  H  12/21/18  14:39    


 


Urine Blood  Negative  (NEGATIVE)   12/21/18  14:39    


 


Urine Nitrate  Negative  (NEGATIVE)   12/21/18  14:39    


 


Urine Bilirubin  1+  (NEGATIVE)  H  12/21/18  14:39    


 


Urine Urobilinogen  2.0 mg/dL (0.2-1.0)   12/21/18  14:39    


 


Ur Leukocyte Esterase  Neg Javier/uL (Negative)   12/21/18  14:39    


 


Urine WBC (Auto)  3 /hpf (0-5)   12/21/18  14:39    


 


Urine RBC (Auto)  3 /hpf (0-3)   12/21/18  14:39    


 


Ur Squamous Epith Cells  3 /hpf (0-5)   12/21/18  14:39    


 


Urine Bacteria  Rare  (<OCC)   12/21/18  14:39    


 


Vancomycin Trough  5.3 ug/mL (5.0-10.0)   12/26/18  15:04    


 


Urine Opiates Screen  Positive  (NEGATIVE)  H  12/21/18  14:39    


 


Urine Methadone Screen  Negative  (NEGATIVE)   12/21/18  14:39    


 


Ur Barbiturates Screen  Negative  (NEGATIVE)   12/21/18  14:39    


 


Ur Phencyclidine Scrn  Negative  (NEGATIVE)   12/21/18  14:39    


 


Ur Amphetamines Screen  Negative  (NEGATIVE)   12/21/18  14:39    


 


U Benzodiazepines Scrn  Positive  (NEGATIVE)   12/21/18  14:39    


 


U Oth Cocaine Metabols  Negative  (NEGATIVE)   12/21/18  14:39    


 


U Cannabinoids Screen  Negative  (NEGATIVE)   12/21/18  14:39    














- Hospital Course


Hospital Course: 





Patient presented to [facility] emergency room complaining of abdominal 

pain nauseous feeling and vomiting.  As per the patient patient is on heroine 

oxycodone MS Contin and Xanax patient apparently ran out of his medication 2 or 

3 days ago and claims that he is in withdrawal.


Patient has a history of cardiomyopathy and low heart rate.


Patient recently had a peripheral vascular intervention on the right leg with by

pass and since then the wound on the lower extremity has not healed.  Patient 

has failed to follow with his primary care or the surgeon who operated him in 

[place] 





After hospitalization, patient's symptoms of withdrawal improved.


Culture of the wound from the leg shows gram-negative rods and MRSA.


ID consult was obtained patient was put on IV antibiotics.


Podiatric consult was also obtained and manage the local wound care.


2 weeks in the hospital patient sign is again medical advice as he refused to go

to rehab to continue his IV antibiotics.  Patient was given prescription for by 

mouth antibiotics and patient will follow with his primary care doctor in 

[facility]





And also to add, patient had persistent bradycardia with no hemodynamic 

compromise.  Thyroid function tests were normal.  Echocardiogram also was 

normal.


Patient was given small dose of hydralazine which increased the heart rate about

10 beats from the baseline.  Prior to discharge the heart rate was in the range 

of 50-55.





Discharge Exam





- Head Exam


Head Exam: NORMAL INSPECTION





Discharge Plan





- Follow Up Plan


Condition: STABLE


Disposition: AGAINST MEDICAL ADVICE

## 2020-08-21 NOTE — CT
PROCEDURE:  CT Abdomen and Pelvis without Oral or IV contrast.



HISTORY:

ABD PAIN, NAUSEA/VOMITING



COMPARISON:

None available.



TECHNIQUE:

Contiguous axial images of the abdomen and pelvis. No oral or IV 

contrast administered. Coronal and Sagittal reformats generated and 

reviewed. 



Radiation dose:



Total exam DLP = 670.28 mGy-cm.



This CT exam was performed using one or more of the following dose 

reduction techniques: Automated exposure control, adjustment of the 

mA and/or kV according to patient size, and/or use of iterative 

reconstruction technique.



FINDINGS:

There is limited evaluation of the solid organs without the 

administration of IV contrast.



LOWER THORAX:

No visible consolidation, pleural effusion, or pneumothorax.  Mild 

cardiomegaly. 



LIVER:

Unremarkable unenhanced appearance. 



GALLBLADDER AND BILE DUCTS:

Unremarkable unenhanced appearance.



PANCREAS:

Unremarkable unenhanced appearance.



SPLEEN:

Unremarkable unenhanced appearance.



ADRENALS:

Unremarkable unenhanced appearance.



KIDNEYS AND URETERS:

No hydronephrosis or obstructing renal calculus. 



BLADDER:

The urinary bladder appears unremarkable.



REPRODUCTIVE:

Prostate gland measures approximately 3.5 x 4.3 cm. 



APPENDIX:

The appendix appears within normal limits of caliber. No secondary 

signs of acute appendicitis.



BOWEL:

The stomach is nondistended. 



Lack of oral contrast limits evaluation for bowel pathology.   The 

bowel loops appear within normal limits of caliber without evidence 

of intestinal obstruction.  Mild wall thickening/mucosal edema 

involving the right colon; correlate clinically for possibility of 

colitis. 



PERITONEUM:

No significant free fluid. No definite free air.



LYMPH NODES:

No bulky lymphadenopathy identified.



VASCULATURE:

No aortic aneurysm. 



BONES:

Multilevel degenerative changes of the spine.



OTHER FINDINGS:

Fat containing umbilical hernia. 



IMPRESSION:

Mild wall thickening/mucosal edema involving the right colon; 

correlate clinically for possibility of colitis. 



Additional findings as above.
You can access the FollowMyHealth Patient Portal offered by [facility] by registering at the following website: [email or web address]. By joining [employer]’s FollowMyHealth portal, you will also be able to view your health information using other applications (apps) compatible with our system.

## (undated) RX ORDER — PREDNISOLONE ACETATE 10 MG/ML: 1 SUSPENSION/ DROPS OPHTHALMIC

## (undated) RX ORDER — OFLOXACIN 3 MG/ML: 1 SOLUTION/ DROPS OPHTHALMIC